# Patient Record
Sex: MALE | Race: BLACK OR AFRICAN AMERICAN | Employment: OTHER | URBAN - METROPOLITAN AREA
[De-identification: names, ages, dates, MRNs, and addresses within clinical notes are randomized per-mention and may not be internally consistent; named-entity substitution may affect disease eponyms.]

---

## 2017-01-17 ENCOUNTER — TELEPHONE (OUTPATIENT)
Dept: CARDIOLOGY CLINIC | Age: 62
End: 2017-01-17

## 2017-01-17 DIAGNOSIS — I48.91 ATRIAL FIBRILLATION, UNSPECIFIED TYPE (HCC): Primary | ICD-10-CM

## 2017-01-17 NOTE — TELEPHONE ENCOUNTER
Dr. Wayne Ag' office called regarding pts POA and would like for Dr. Elyse Rousseau to call at his convinience.  The office number is [phone]

## 2017-01-18 RX ORDER — ASPIRIN 81 MG/1
81 TABLET ORAL DAILY
Qty: 30 TAB | Refills: 6
Start: 2017-01-18 | End: 2022-05-18

## 2017-01-18 NOTE — TELEPHONE ENCOUNTER
Spoke with Dr. Julisa Winn. He saw Mr. Wilson yesterday for follow up evaluation of fatigue and dyspnea. New dx of AF 12/2016. EKG from visit yesterday. AF, rate 51 (Metoprolol reduced further to 25 mg daily)    Previous EKG 12/7/16 - AF, rate 43 (beta blocker dose decreased at that time also)    Mr. Rosa Blake has been tentatively diagnosed with RANDY per home study ( he reports >50 documented episodes of apnea). He is awaiting repeat sleep study and Rx of CPAP. Reviewed lab work received from [facility] office - drawn 12/12/16:  H/H 12.6/40.9  Creat 1.0   BUN 14  K 4.0    Discussed moving forward with NOAC, as discussed with Dr. Darci Moura during office visit 12/7/16. Will begin Eliquis 5 mg BID. Reviewed MOA and the importance of close monitoring for abnormal bleeding. Discussed when and how to take medication. He voices understanding of medication and instructions and is agreeable to proceed with Rx. Advised that he contact us with any signs of abnormal bleeding. Plan to reduce ASA dose from 325 mg daily down to 81 mg daily.

## 2017-01-18 NOTE — TELEPHONE ENCOUNTER
Unable to speak to anyone in Dr. Shannon Hammond office. Sent to [place]. Contacted patient to follow-up on his symptoms. He states he saw his PCP the other day. KIM and fatigue is about the same as when he was last here on 12/7/16. He states he was positive for sleep apnea. He has additional testing scheduled for the end of this month. He has not received a CPAP yet. Patient notified that lab work has been received from his PCP. Will have NP review.

## 2017-01-19 ENCOUNTER — TELEPHONE (OUTPATIENT)
Dept: CARDIOLOGY CLINIC | Age: 62
End: 2017-01-19

## 2017-01-19 DIAGNOSIS — I48.91 ATRIAL FIBRILLATION, UNSPECIFIED TYPE (HCC): ICD-10-CM

## 2017-01-19 NOTE — TELEPHONE ENCOUNTER
Patient called regarding samples of Eliquis. He is waiting for his mail order rx to arrive and needs samples until then. Please give him a call at [phone].  Thank you

## 2017-08-28 ENCOUNTER — HOSPITAL ENCOUNTER (OUTPATIENT)
Dept: ULTRASOUND IMAGING | Age: 62
Discharge: HOME | End: 2017-08-28
Attending: FAMILY MEDICINE
Payer: COMMERCIAL

## 2017-08-28 DIAGNOSIS — R10.9 ABDOMINAL PAIN, UNSPECIFIED SITE: ICD-10-CM

## 2017-08-28 PROCEDURE — 76700 US EXAM ABDOM COMPLETE: CPT

## 2020-02-20 ENCOUNTER — OFFICE VISIT (OUTPATIENT)
Dept: CARDIOLOGY CLINIC | Age: 65
End: 2020-02-20

## 2020-02-20 VITALS
OXYGEN SATURATION: 97 % | HEIGHT: 70 IN | HEART RATE: 69 BPM | DIASTOLIC BLOOD PRESSURE: 80 MMHG | WEIGHT: 256 LBS | BODY MASS INDEX: 36.65 KG/M2 | SYSTOLIC BLOOD PRESSURE: 128 MMHG

## 2020-02-20 DIAGNOSIS — E66.01 SEVERE OBESITY (HCC): ICD-10-CM

## 2020-02-20 DIAGNOSIS — Z95.1 S/P CABG X 3: ICD-10-CM

## 2020-02-20 DIAGNOSIS — I25.10 ATHEROSCLEROSIS OF NATIVE CORONARY ARTERY OF NATIVE HEART WITHOUT ANGINA PECTORIS: ICD-10-CM

## 2020-02-20 DIAGNOSIS — I11.9 BENIGN HYPERTENSIVE HEART DISEASE WITHOUT HEART FAILURE: ICD-10-CM

## 2020-02-20 DIAGNOSIS — I48.91 ATRIAL FIBRILLATION WITH SLOW VENTRICULAR RESPONSE (HCC): Primary | ICD-10-CM

## 2020-02-20 DIAGNOSIS — R06.09 DOE (DYSPNEA ON EXERTION): ICD-10-CM

## 2020-02-20 NOTE — PROGRESS NOTES
Radha Gillette is a 75 y.o. male    Chief Complaint   Patient presents with    Coronary Artery Disease       Chest pain no  SOB no  Dizziness no  Swelling no  Recent hospital visit no  Refills no  Visit Vitals  /80 (BP 1 Location: Left arm, BP Patient Position: At rest)   Pulse 69   Ht 5' 10\" (1.778 m)   Wt 256 lb (116.1 kg)   SpO2 97%   BMI 36.73 kg/m²

## 2020-02-20 NOTE — LETTER
2/23/20 Patient: Margarito Alan YOB: 1955 Date of Visit: 2/20/2020 Magalis Quintana MD 
[address] [address] VIA Facsimile: [phone] Dear Magalis Quintana MD, Thank you for referring Mr. Van Grewal to [facility] for evaluation. My notes for this consultation are attached. If you have questions, please do not hesitate to call me. I look forward to following your patient along with you. Sincerely, Dontae Eaton MD

## 2020-02-20 NOTE — PROGRESS NOTES
Adebayo Jacobson MD [facility]  [address]  [place], [address] [facility]    Office [phone]  Fax [phone]  Cell [phone]         Radha Gillette is a 75 y.o. male. Last seen by me 12/7/2016. Here at the request of Dr Larisa Bernabe for revaluation of CAD/AF    Assessment  Encounter Diagnoses   Name Primary?  Atherosclerosis of native coronary artery of native heart without angina pectoris     Benign hypertensive heart disease without heart failure     Atrial fibrillation with slow ventricular response (HCC) Yes    S/P CABG x 3     KIM (dyspnea on exertion)     Severe obesity (HCC)        Recommendations:    CAD s/p CABG 2006. Lexiscan cardiolite 2015 was normal. He has chronic KIM and fatigue but leads fairly sedentary lifestyle. Drivers of risk include HTN, and dyslipidemia. Favor re-evaluation with stress testing in near future. - Exercise Cardiolite in 3-4 weeks, after washing out Metoprolol  - Continue ASA 81mg/d    Chronic AF with slow VR on low dose metoprolol. Wonder if fatigue may be d/t bradycardia. Substrate = HTN, CAD, and RANDY. - Continue rate control but stop Metoprolol completely. - Evaluate HR response w/ exercise w/ upcoming stress test  - Stroke prevention with Eliquis 5 BID    Chronic HTN, controlled on combination therapy. - Continue home BP monitoring    Phone follow up after reviewing tests      Subjective:    Radha Gillette reports he is feeling okay. He reports he walks around his neighborhood for activity, with exertonal fatigue. Otherwise a fairly sedentary lifestyle with stable pattern of KIM. Patient denies any exertional chest pain, palpitations, syncope, orthopnea, edema or paroxysmal nocturnal dyspnea. He denies dizziness or lightheadedness. BP at home 130-140. He is adherent with CPAP for RANDY. No bleeding problems on Eliquis. He has run out as of a week ago. Of note, he visits [facility] at [facility].     Cardiac risk factors   HTN no  DM  yes    Cardiac testing  Stress echo 5/2011 - 8 minutes, normal, resting EF 60%  CT 8/29/13 - 138   Echo 12/14/16 - EF 55-60%. No WMA. Wall thickness is mild to moderately increased. Mild LVH. LA mildly dilated. AoV sclerosis without stenosis. Past Medical History:   Diagnosis Date    Atrial fibrillation with slow ventricular response ([address] 12/10/2016    Benign hypertensive heart disease without heart failure     Coronary atherosclerosis of native coronary artery     presented with NSTEMI, s/p CABG 7419    Metabolic disorder mixed hyperlipidemia     S/P CABG x 3 2006    LIMA-LAD, VG-OM, VG-PDA    Type II or unspecified type diabetes mellitus without mention of complication, not stated as uncontrolled         Current Outpatient Medications   Medication Sig Dispense Refill    apixaban (ELIQUIS) 5 mg tablet Take 1 Tab by mouth two (2) times a day. Indications: PREVENT THROMBOEMBOLISM IN CHRONIC ATRIAL FIBRILLATION 180 Tab 3    aspirin delayed-release 81 mg tablet Take 1 Tab by mouth daily. Indications: myocardial infarction prevention 30 Tab 6    testosterone (ANDROGEL) 1.25 gram/ actuation (1 %) glpm 20.25 mg by TransDERmal route See Admin Instructions. 3 pumps daily      atorvastatin (LIPITOR) 40 mg tablet Take  by mouth daily.  cyclobenzaprine (FLEXERIL) 10 mg tablet Take  by mouth three (3) times daily as needed for Muscle Spasm(s).  tamsulosin (FLOMAX) 0.4 mg capsule Take 0.4 mg by mouth daily.  HYDROcodone-acetaminophen (NORCO) 5-325 mg per tablet Take  by mouth every eight (8) hours as needed for Pain.  metFORMIN (GLUCOPHAGE) 500 mg tablet Take  by mouth two (2) times daily (with meals).  fenofibric acid (TRILIPIX) 135 mg capsule Take  by mouth daily.  sildenafil citrate (VIAGRA) 100 mg tablet Take 100 mg by mouth as needed.  ergocalciferol (VITAMIN D2) 50,000 unit capsule Take 50,000 Units by mouth.       amlodipine (NORVASC) 10 mg tablet Take  by mouth daily.  clonidine (CATAPRESS) 0.2 mg tablet Take  by mouth two (2) times a day.  rosuvastatin (CRESTOR) 20 mg tablet Take 20 mg by mouth nightly.  DOCOSAHEXANOIC ACID/EPA (FISH OIL PO) Take  by mouth.  FERROUS FUMARATE (IRON PO) Take 1 Tab by mouth daily.  apixaban (ELIQUIS) 5 mg tablet Take 1 Tab by mouth two (2) times a day. 56 Tab 0    magnesium oxide (MAG-OX) 400 mg tablet Take 400 mg by mouth daily. Allergies   Allergen Reactions    Vitamin D [Cholecalciferol (Vitamin D3)] Hives     Large doses      Retired .       Review of Symptoms:  Constitutional: negative for fevers, chills and sweats. +fatigue. Respiratory: negative for cough, sputum or hemoptysis. +KIM   Gastrointestinal: negative for dysphagia, odynophagia and abdominal pain  Musculoskeletal:negative for myalgias and arthralgias  Neurological: negative for headaches, dizziness and memory problems      Physical Exam    Visit Vitals  /80 (BP 1 Location: Left arm, BP Patient Position: At rest)   Pulse 69   Ht 5' 10\" (1.778 m)   Wt 256 lb (116.1 kg)   SpO2 97%   BMI 36.73 kg/m²     Wt Readings from Last 3 Encounters:   02/20/20 256 lb (116.1 kg)   12/07/16 265 lb 12.8 oz (120.6 kg)   03/02/15 267 lb 3.2 oz (121.2 kg)      Neck - JVP normal, thyroid nl  Cardiac - Irregularly irregular rhythm   Vascular - carotids without bruits, radials, femorals and pedal pulses equal bilateral  Lungs - clear to auscultation bilaterals, no rales ,wheezing or rhonchi  Abd - soft nontender, no HSM, no abd bruits  Extremities - no edema    Cardiographics  Labs May 2014 - , , HDL 74, TG 85, HB Ac 8.2,   EKG 3/2/15 - SR, Nonspecific T wave inversions V2 and V3, NSSTT abnormalities   Lexican cardiolite 03/17/15 - normal perfusion LVEF 56%. EKG 12/07/16 - AF 40s, compared with tracing 01/20/16 AF newly noted.      Written by Tamanna Crook, as dictated by Brit Johnston MD. Sasha Ellison MD

## 2020-02-23 PROBLEM — E66.01 SEVERE OBESITY (HCC): Status: ACTIVE | Noted: 2020-02-23

## 2020-03-20 ENCOUNTER — DOCUMENTATION ONLY (OUTPATIENT)
Dept: CARDIOLOGY CLINIC | Age: 65
End: 2020-03-20

## 2020-03-20 NOTE — PROGRESS NOTES
Please reschedule Mr. Wilson 2 day nuclear test per Dr. Oswaldo Villagran ([address]). Patient told office is still open if he needs to reach out with any concerns or questions. Louis Vargas

## 2020-06-08 ENCOUNTER — TELEPHONE (OUTPATIENT)
Dept: CARDIOLOGY CLINIC | Age: 65
End: 2020-06-08

## 2020-06-08 ENCOUNTER — DOCUMENTATION ONLY (OUTPATIENT)
Dept: CARDIOLOGY CLINIC | Age: 65
End: 2020-06-08

## 2020-06-08 NOTE — PROGRESS NOTES
L/M for patient to call back. He has a exercise Nuclear stress test coming up June 16/17 2020. He will need a COVID test by 6/10/2020. It is at no charge as long as it is done at one of our 4 testing facilities.

## 2020-06-08 NOTE — PROGRESS NOTES
Gave patient COVID19 testing instructions for upcoming nuclear testing. Patient will be going to [facility]. Patient verbalized understanding that this needs to be done by 6/10/2020 and to avoid crowds prior to his appointment. Paper faxed to [facility].

## 2020-06-12 ENCOUNTER — OFFICE VISIT (OUTPATIENT)
Dept: PRIMARY CARE CLINIC | Age: 65
End: 2020-06-12

## 2020-06-12 DIAGNOSIS — Z01.818 PRE-OP EVALUATION: Primary | ICD-10-CM

## 2020-06-14 LAB — SARS-COV-2, NAA: NOT DETECTED

## 2020-06-16 NOTE — TELEPHONE ENCOUNTER
Patient wanting to know COVID results from test he had to take prior to nuclear study. Please advise.      Phone: [phone]

## 2020-06-19 ENCOUNTER — TELEPHONE (OUTPATIENT)
Dept: CARDIOLOGY CLINIC | Age: 65
End: 2020-06-19

## 2020-06-19 NOTE — TELEPHONE ENCOUNTER
Exercise cardiolite 6/19/20 - 3:46 min, good HR response, normal perfusion/EF    No myocardial ischemia  HR better off metoprolol  Energy a bit better    Findings reviewed with Mr Yaniv Alejandra    RTC 4 months

## 2020-11-24 ENCOUNTER — OFFICE VISIT (OUTPATIENT)
Dept: CARDIOLOGY CLINIC | Age: 65
End: 2020-11-24
Payer: MEDICARE

## 2020-11-24 VITALS
OXYGEN SATURATION: 98 % | SYSTOLIC BLOOD PRESSURE: 132 MMHG | HEIGHT: 70 IN | HEART RATE: 84 BPM | BODY MASS INDEX: 34.93 KG/M2 | DIASTOLIC BLOOD PRESSURE: 86 MMHG | WEIGHT: 244 LBS

## 2020-11-24 DIAGNOSIS — E11.9 TYPE 2 DIABETES MELLITUS WITHOUT COMPLICATION, WITHOUT LONG-TERM CURRENT USE OF INSULIN (HCC): ICD-10-CM

## 2020-11-24 DIAGNOSIS — I25.10 ATHEROSCLEROSIS OF NATIVE CORONARY ARTERY OF NATIVE HEART WITHOUT ANGINA PECTORIS: Primary | ICD-10-CM

## 2020-11-24 DIAGNOSIS — Z95.1 S/P CABG X 3: ICD-10-CM

## 2020-11-24 DIAGNOSIS — I48.20 CHRONIC ATRIAL FIBRILLATION (HCC): ICD-10-CM

## 2020-11-24 DIAGNOSIS — I11.9 BENIGN HYPERTENSIVE HEART DISEASE WITHOUT HEART FAILURE: ICD-10-CM

## 2020-11-24 DIAGNOSIS — E66.01 SEVERE OBESITY (HCC): ICD-10-CM

## 2020-11-24 DIAGNOSIS — E78.2 MIXED HYPERLIPIDEMIA: ICD-10-CM

## 2020-11-24 PROCEDURE — G0463 HOSPITAL OUTPT CLINIC VISIT: HCPCS | Performed by: SPECIALIST

## 2020-11-24 PROCEDURE — 99214 OFFICE O/P EST MOD 30 MIN: CPT | Performed by: SPECIALIST

## 2020-11-24 NOTE — LETTER
11/25/20 Patient: Román William YOB: 1955 Date of Visit: 11/24/2020 Lizbeth Monaco MD 
[address] [address] VIA Facsimile: [phone] Dear Lizbeth Monaco MD, Thank you for referring Mr. Jordy Jimeenz to [facility] for evaluation. My notes for this consultation are attached. If you have questions, please do not hesitate to call me. I look forward to following your patient along with you. Sincerely, Keara Velasco MD

## 2020-11-24 NOTE — PROGRESS NOTES
Adebayo Zimmemran MD [facility]  [address] [facility], [address] [facility]    Office [phone]  Fax [phone]  Cell [phone]         Ami Ma is a 77 y.o. male. Last seen by me 9 months ago. Assessment  Encounter Diagnoses   Name Primary?  Chronic atrial fibrillation (HCC)     Benign hypertensive heart disease without heart failure     Atherosclerosis of native coronary artery of native heart without angina pectoris Yes    Metabolic disorder mixed hyperlipidemia     S/P CABG x 3     Severe obesity (HCC)     Type 2 diabetes mellitus without complication, without long-term current use of insulin (HCC)        Recommendations:    CAD s/p CABG 2006. Exercise Cardiolite June 2020 normal. He has no exertional sxs but is fairly sedentary. Drivers of risk include HTN, prediabetes, and dyslipidemia.  - Continue ASA 81mg/d    Chronic AF w/ controlled VR on no AV edgar medications. Metoprolol discontinued Feb 2020. with slow VR on low dose metoprolol. Substrate = HTN, CAD, and RANDY. Reviewed sxs of bradycardia that would raise concern. HR response during recent stress test was nl.   - Continue Eliquis 5 mg BID for stroke prevention     Chronic HTN, controlled on combination therapy. - Continue home BP monitoring    Dyslipidemia/prediabetes managed by Hugh Feng MD.     6 month f/u       Subjective:    He reports no interval cardiac sxs and feels well. Patient denies any exertional chest pain, dyspnea, palpitations, syncope, orthopnea, edema or paroxysmal nocturnal dyspnea. He reports staying active by walking and using the elliptical machine w/o exertional sxs. BP at home 459 systolic. Lipids/blood work through Hugh Feng MD.     Adherent with CPAP for RANDY. No bleeding problems on Eliquis. Of note, he visits [facility].  He lives in [place], [place]    Cardiac risk factors   HTN no  DM  yes    Cardiac testing  Stress echo 5/2011 - 8 minutes, normal, resting EF 60%  CT 8/29/13 - 138   Echo 12/14/16 - EF 55-60%. No WMA. Wall thickness is mild to moderately increased. Mild LVH. LA mildly dilated. AoV sclerosis without stenosis. Exercise Cardiolite 6/9/20- 6:36 min, normal perfusion, EF 55%       Past Medical History:   Diagnosis Date    Atrial fibrillation with slow ventricular response ([address] 12/10/2016    Benign hypertensive heart disease without heart failure     Coronary atherosclerosis of native coronary artery     presented with NSTEMI, s/p CABG 4314    Metabolic disorder mixed hyperlipidemia     S/P CABG x 3 2006    LIMA-LAD, VG-OM, VG-PDA    Type II or unspecified type diabetes mellitus without mention of complication, not stated as uncontrolled         Current Outpatient Medications   Medication Sig Dispense Refill    apixaban (ELIQUIS) 5 mg tablet Take 1 Tab by mouth two (2) times a day. Indications: PREVENT THROMBOEMBOLISM IN CHRONIC ATRIAL FIBRILLATION 180 Tab 3    apixaban (ELIQUIS) 5 mg tablet Take 1 Tab by mouth two (2) times a day. 56 Tab 0    aspirin delayed-release 81 mg tablet Take 1 Tab by mouth daily. Indications: myocardial infarction prevention 30 Tab 6    testosterone (ANDROGEL) 1.25 gram/ actuation (1 %) glpm 20.25 mg by TransDERmal route See Admin Instructions. 3 pumps daily      cyclobenzaprine (FLEXERIL) 10 mg tablet Take  by mouth three (3) times daily as needed for Muscle Spasm(s).  tamsulosin (FLOMAX) 0.4 mg capsule Take 0.4 mg by mouth two (2) times a day.  HYDROcodone-acetaminophen (NORCO) 5-325 mg per tablet Take  by mouth every eight (8) hours as needed for Pain.  metFORMIN (GLUCOPHAGE) 500 mg tablet Take  by mouth two (2) times daily (with meals).  fenofibric acid (TRILIPIX) 135 mg capsule Take  by mouth daily.  sildenafil citrate (VIAGRA) 100 mg tablet Take 100 mg by mouth as needed.       ergocalciferol (VITAMIN D2) 50,000 unit capsule Take 50,000 Units by mouth every seven (7) days.  amlodipine (NORVASC) 10 mg tablet Take  by mouth daily.  clonidine (CATAPRESS) 0.2 mg tablet Take  by mouth two (2) times a day.  rosuvastatin (CRESTOR) 20 mg tablet Take 20 mg by mouth nightly.  DOCOSAHEXANOIC ACID/EPA (FISH OIL PO) Take  by mouth two (2) times a day.  FERROUS FUMARATE (IRON PO) Take 1 Tab by mouth as needed.  atorvastatin (LIPITOR) 40 mg tablet Take  by mouth daily.  magnesium oxide (MAG-OX) 400 mg tablet Take 400 mg by mouth daily. Allergies   Allergen Reactions    Vitamin D [Cholecalciferol (Vitamin D3)] Hives     Large doses      Retired .       Review of Symptoms:  Constitutional: negative for fevers, chills and sweats. +fatigue. Respiratory: negative for cough, sputum or hemoptysis. +KIM   Gastrointestinal: negative for dysphagia, odynophagia and abdominal pain  Musculoskeletal:negative for myalgias and arthralgias  Neurological: negative for headaches, dizziness and memory problems      Physical Exam    Visit Vitals  /86 (BP 1 Location: Left arm, BP Patient Position: Sitting)   Pulse 84   Ht 5' 10\" (1.778 m)   Wt 244 lb (110.7 kg)   SpO2 98%   BMI 35.01 kg/m²     Wt Readings from Last 3 Encounters:   11/24/20 244 lb (110.7 kg)   02/20/20 256 lb (116.1 kg)   12/07/16 265 lb 12.8 oz (120.6 kg)      Neck - JVP normal, thyroid nl  Cardiac - Irregularly irregular rhythm   Vascular - carotids without bruits, radials, femorals and pedal pulses equal bilateral  Lungs - clear to auscultation bilaterals, no rales ,wheezing or rhonchi  Abd - soft nontender, no HSM, no abd bruits  Extremities - no edema    Cardiographics  Labs May 2014 - , , HDL 74, TG 85, HB Ac 8.2,   EKG 3/2/15 - SR, Nonspecific T wave inversions V2 and V3, NSSTT abnormalities   Lexican cardiolite 03/17/15 - normal perfusion LVEF 56%. EKG 12/07/16 - AF 40s, compared with tracing 01/20/16 AF newly noted.    Exercise Cardiolite 6/9/20- 6:36 min, normal perfusion, EF 55%    Written by Elvi Joya, as dictated by Tiana Dumont MD.     Tiana Dumont MD

## 2021-02-04 LAB — SARS-COV-2 AB, IGG, CORG1M: REACTIVE

## 2021-05-05 ENCOUNTER — OFFICE VISIT (OUTPATIENT)
Dept: CARDIOLOGY CLINIC | Age: 66
End: 2021-05-05
Payer: MEDICARE

## 2021-05-05 VITALS
SYSTOLIC BLOOD PRESSURE: 135 MMHG | DIASTOLIC BLOOD PRESSURE: 85 MMHG | OXYGEN SATURATION: 98 % | WEIGHT: 240 LBS | HEART RATE: 80 BPM | HEIGHT: 70 IN | BODY MASS INDEX: 34.36 KG/M2

## 2021-05-05 DIAGNOSIS — I48.20 CHRONIC ATRIAL FIBRILLATION (HCC): Primary | ICD-10-CM

## 2021-05-05 DIAGNOSIS — I11.9 BENIGN HYPERTENSIVE HEART DISEASE WITHOUT HEART FAILURE: ICD-10-CM

## 2021-05-05 DIAGNOSIS — Z95.1 S/P CABG X 3: ICD-10-CM

## 2021-05-05 PROCEDURE — G8417 CALC BMI ABV UP PARAM F/U: HCPCS | Performed by: SPECIALIST

## 2021-05-05 PROCEDURE — 93005 ELECTROCARDIOGRAM TRACING: CPT | Performed by: SPECIALIST

## 2021-05-05 PROCEDURE — 3017F COLORECTAL CA SCREEN DOC REV: CPT | Performed by: SPECIALIST

## 2021-05-05 PROCEDURE — 1101F PT FALLS ASSESS-DOCD LE1/YR: CPT | Performed by: SPECIALIST

## 2021-05-05 PROCEDURE — 99214 OFFICE O/P EST MOD 30 MIN: CPT | Performed by: SPECIALIST

## 2021-05-05 PROCEDURE — G8432 DEP SCR NOT DOC, RNG: HCPCS | Performed by: SPECIALIST

## 2021-05-05 PROCEDURE — G0463 HOSPITAL OUTPT CLINIC VISIT: HCPCS | Performed by: SPECIALIST

## 2021-05-05 PROCEDURE — 93010 ELECTROCARDIOGRAM REPORT: CPT | Performed by: SPECIALIST

## 2021-05-05 PROCEDURE — G8427 DOCREV CUR MEDS BY ELIG CLIN: HCPCS | Performed by: SPECIALIST

## 2021-05-05 PROCEDURE — G8536 NO DOC ELDER MAL SCRN: HCPCS | Performed by: SPECIALIST

## 2021-05-05 RX ORDER — CARVEDILOL 3.12 MG/1
3.12 TABLET ORAL 2 TIMES DAILY WITH MEALS
Qty: 40 TAB | Refills: 0
Start: 2021-05-05

## 2021-05-05 NOTE — PROGRESS NOTES
Bruce Atwood MD. [facility]              Patient: Susanna Presley  : 1955      Today's Date: 2021          HISTORY OF PRESENT ILLNESS:     History of Present Illness:  Here for routine FU. No CP or SOB. No dizziness. He feels well. No bleeding. He has been skipping Eliquis past month, waiting for deductible to come down. PAST MEDICAL HISTORY:     Past Medical History:   Diagnosis Date    Atrial fibrillation with slow ventricular response ([address] 12/10/2016    Benign hypertensive heart disease without heart failure     Coronary atherosclerosis of native coronary artery     presented with NSTEMI, s/p CABG 8632    Metabolic disorder mixed hyperlipidemia     S/P CABG x 3     LIMA-LAD, VG-OM, VG-PDA    Type II or unspecified type diabetes mellitus without mention of complication, not stated as uncontrolled              MEDICATIONS:     Current Outpatient Medications   Medication Sig Dispense Refill    carvediloL (COREG) 3.125 mg tablet Take 1 Tab by mouth two (2) times daily (with meals). 40 Tab 0    aspirin delayed-release 81 mg tablet Take 1 Tab by mouth daily. Indications: myocardial infarction prevention 30 Tab 6    testosterone (ANDROGEL) 1.25 gram/ actuation (1 %) glpm 20.25 mg by TransDERmal route See Admin Instructions. 3 pumps daily      tamsulosin (FLOMAX) 0.4 mg capsule Take 0.4 mg by mouth two (2) times a day.  metFORMIN (GLUCOPHAGE) 500 mg tablet Take  by mouth two (2) times daily (with meals).  fenofibric acid (TRILIPIX) 135 mg capsule Take  by mouth daily.  sildenafil citrate (VIAGRA) 100 mg tablet Take 100 mg by mouth as needed.  ergocalciferol (VITAMIN D2) 50,000 unit capsule Take 50,000 Units by mouth every seven (7) days.  amlodipine (NORVASC) 10 mg tablet Take  by mouth daily.  clonidine (CATAPRESS) 0.2 mg tablet Take  by mouth two (2) times a day.  rosuvastatin (CRESTOR) 20 mg tablet Take 20 mg by mouth nightly.       DOCOSAHEXANOIC ACID/EPA (FISH OIL PO) Take  by mouth two (2) times a day.  FERROUS FUMARATE (IRON PO) Take 1 Tab by mouth as needed.  apixaban (ELIQUIS) 5 mg tablet Take 1 Tab by mouth two (2) times a day. Indications: PREVENT THROMBOEMBOLISM IN CHRONIC ATRIAL FIBRILLATION 180 Tab 3    apixaban (ELIQUIS) 5 mg tablet Take 1 Tab by mouth two (2) times a day. 56 Tab 0    cyclobenzaprine (FLEXERIL) 10 mg tablet Take  by mouth three (3) times daily as needed for Muscle Spasm(s).  HYDROcodone-acetaminophen (NORCO) 5-325 mg per tablet Take  by mouth every eight (8) hours as needed for Pain.  magnesium oxide (MAG-OX) 400 mg tablet Take 400 mg by mouth daily. Allergies   Allergen Reactions    Vitamin D [Cholecalciferol (Vitamin D3)] Hives     Large doses           SOCIAL HISTORY:     Social History     Tobacco Use    Smoking status: Never Smoker    Smokeless tobacco: Never Used   Substance Use Topics    Alcohol use: Yes     Comment: twice a mo    Drug use: Not Currently         FAMILY HISTORY:     Family History   Problem Relation Age of Onset    Heart Attack Father                REVIEW OF SYMPTOMS:     Review of Symptoms:  Constitutional: Negative for fever, chills  HEENT: Negative for nosebleeds, tinnitus, and vision changes. Respiratory: Negative for cough, wheezing  Cardiovascular: Negative for orthopnea, claudication, syncope, and PND. Gastrointestinal: Negative for abdominal pain, diarrhea, melena. Genitourinary: Negative for dysuria  Musculoskeletal: Negative for myalgias. Skin: Negative for rash  Heme: No problems bleeding. Neurological: Negative for speech change and focal weakness.            PHYSICAL EXAM:     Physical Exam:  Visit Vitals  /85 (BP 1 Location: Left upper arm, BP Patient Position: Sitting, BP Cuff Size: Adult)   Pulse 80   Ht 5' 10\" (1.778 m)   Wt 240 lb (108.9 kg)   SpO2 98%   BMI 34.44 kg/m²     Patient appears generally well, mood and affect are appropriate and pleasant. HEENT:  Hearing intact, non-icteric, normocephalic, atraumatic. Neck Exam: Supple, No JVD or carotid bruits. Lung Exam: Clear to auscultation, even breath sounds. Cardiac Exam: Irregularly irregular no murmur or rub  Abdomen: Soft, non-tender, normal bowel sounds. No bruits or masses. Extremities: Moves all ext well. No lower extremity edema. MSKTL: Overall good ROM ext  Skin: No significant rashes  Psych: Appropriate affect  Neuro - Grossly intact      LABS / OTHER STUDIES reviewed:       Lab Results   Component Value Date/Time    Bilirubin, total 0.6 07/28/2009 10:21 AM    Alk. phosphatase 68 07/28/2009 10:21 AM    Protein, total 8.3 (H) 07/28/2009 10:21 AM    Albumin 4.6 07/28/2009 10:21 AM    Globulin 3.7 07/28/2009 10:21 AM    A-G Ratio 1.2 07/28/2009 10:21 AM    ALT (SGPT) 39 07/28/2009 10:21 AM    AST (SGOT) 19 07/28/2009 10:21 AM     No results found for: WBC, WBCLT, HGBPOC, HGB, HGBP, HCTPOC, HCT, PHCT, RBCH, PLT, MCV, HGBEXT, HCTEXT, PLTEXT, HGBEXT, HCTEXT, PLTEXT    Lab Results   Component Value Date/Time    Cholesterol, total 193 07/28/2009 10:21 AM    HDL Cholesterol 47 07/28/2009 10:21 AM    LDL, calculated 131.4 (H) 07/28/2009 10:21 AM    VLDL, calculated 14.6 07/28/2009 10:21 AM    Triglyceride 73 07/28/2009 10:21 AM    CHOL/HDL Ratio 4.1 07/28/2009 10:21 AM       Lasb 2/21 - Ap[address] 118, proBNP 287, CMP OK,         CARDIAC DIAGNOSTICS:     Cardiac Evaluation Includes:  I reviewed the results below. Stress echo 5/2011 - 8 minutes, normal, resting EF 60%  CT 8/29/13 - 138   Echo 12/14/16 - EF 55-60%. No WMA. Wall thickness is mild to moderately increased. Mild LVH. LA mildly dilated. AoV sclerosis without stenosis.     Exercise Cardiolite 6/9/20- 6:36 min, normal perfusion, EF 55%     EKG 2/3/21 - Afib, HR 44 bpm   EKG 5/5/21 - Afib,       ASSESSMENT AND PLAN:     Assessment and Plan:    1) CAD s/p CABG 2006.    - Exercise Cardiolite June 2020 normal.   - He has no exertional sxs but is fairly sedentary   - Continue ASA 81mg/d, BB, statin      2) Chronic AF w/ controlled VR on no AV edgar medications. - Had metoprolol stopped in 2/20 due to bradycardia but he is on low dose Coreg when I see him 5/5/21   - Continue Eliquis 5 mg BID for stroke prevention (he has been skipping [address] when he is in the \"donut hole\" -- I reviewed importance of compliance and asked him to reach out to us when he needs help)      3) Chronic HTN,  - BP OK at home  - Continue meds and home BP monitoring     4) Dyslipidemia/prediabetes managed by Belia Wood MD.  - on a statin and fibrate     5) See me in 6 months.         Lives with wife. Retired from [employer]. Bao Espinoza MD, [place] [address]. [address], [address] [place] [address]  Ph: [phone]   Ph [phone]      ADDENDUM   7/28/2021  Brent Richmond 7/27/21 - ventricular perfusion is probably normal. Myocardial perfusion imaging supports a low risk stress test.  LVEF 54%    Will have nurse call with normal findings.

## 2021-05-05 NOTE — PROGRESS NOTES
Julien Strickland is a 77 y.o. male    Visit Vitals  BP (!) 150/80 (BP 1 Location: Left upper arm, BP Patient Position: Sitting, BP Cuff Size: Adult)   Pulse 80   Ht 5' 10\" (1.778 m)   Wt 240 lb (108.9 kg)   SpO2 98%   BMI 34.44 kg/m²       Chief Complaint   Patient presents with    Follow-up     6 mo; former [place] pt    Irregular Heart Beat     AF    Other     s/p CABG x3; athero       Chest pain NO  SOB NO  Dizziness NO  Swelling LEGS  Recent hospital visit NO  Refills NO

## 2021-06-21 LAB — HBA1C MFR BLD HPLC: 5 %

## 2021-07-01 ENCOUNTER — TELEPHONE (OUTPATIENT)
Dept: CARDIOLOGY CLINIC | Age: 66
End: 2021-07-01

## 2021-07-01 NOTE — TELEPHONE ENCOUNTER
Patient requesting to speak to nurse. States he was prescribed a new medication by his primary care and was told to see Dr. Shannon Brizuela as soon as possible. Offered first available at [address] [place] [address] 07/21; patient stated he needed an earlier date. Patient did not have name of medication at time of call.      Phone: [phone]
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
## 2021-07-02 ENCOUNTER — OFFICE VISIT (OUTPATIENT)
Dept: CARDIOLOGY CLINIC | Age: 66
End: 2021-07-02
Payer: MEDICARE

## 2021-07-02 VITALS
SYSTOLIC BLOOD PRESSURE: 118 MMHG | HEART RATE: 60 BPM | OXYGEN SATURATION: 97 % | BODY MASS INDEX: 35.07 KG/M2 | DIASTOLIC BLOOD PRESSURE: 62 MMHG | HEIGHT: 70 IN | WEIGHT: 245 LBS

## 2021-07-02 DIAGNOSIS — Z95.1 S/P CABG X 3: ICD-10-CM

## 2021-07-02 DIAGNOSIS — R94.31 ABNORMAL EKG: Primary | ICD-10-CM

## 2021-07-02 DIAGNOSIS — I11.9 BENIGN HYPERTENSIVE HEART DISEASE WITHOUT HEART FAILURE: ICD-10-CM

## 2021-07-02 DIAGNOSIS — I48.91 ATRIAL FIBRILLATION WITH SLOW VENTRICULAR RESPONSE (HCC): ICD-10-CM

## 2021-07-02 DIAGNOSIS — E11.9 TYPE 2 DIABETES MELLITUS WITHOUT COMPLICATION, WITHOUT LONG-TERM CURRENT USE OF INSULIN (HCC): ICD-10-CM

## 2021-07-02 DIAGNOSIS — E78.2 MIXED HYPERLIPIDEMIA: ICD-10-CM

## 2021-07-02 DIAGNOSIS — I25.119 ATHEROSCLEROSIS OF NATIVE CORONARY ARTERY OF NATIVE HEART WITH ANGINA PECTORIS (HCC): ICD-10-CM

## 2021-07-02 DIAGNOSIS — E66.01 SEVERE OBESITY (HCC): ICD-10-CM

## 2021-07-02 PROCEDURE — 3017F COLORECTAL CA SCREEN DOC REV: CPT | Performed by: NURSE PRACTITIONER

## 2021-07-02 PROCEDURE — G8536 NO DOC ELDER MAL SCRN: HCPCS | Performed by: NURSE PRACTITIONER

## 2021-07-02 PROCEDURE — G8432 DEP SCR NOT DOC, RNG: HCPCS | Performed by: NURSE PRACTITIONER

## 2021-07-02 PROCEDURE — 93010 ELECTROCARDIOGRAM REPORT: CPT | Performed by: NURSE PRACTITIONER

## 2021-07-02 PROCEDURE — 1101F PT FALLS ASSESS-DOCD LE1/YR: CPT | Performed by: NURSE PRACTITIONER

## 2021-07-02 PROCEDURE — 3046F HEMOGLOBIN A1C LEVEL >9.0%: CPT | Performed by: NURSE PRACTITIONER

## 2021-07-02 PROCEDURE — G0463 HOSPITAL OUTPT CLINIC VISIT: HCPCS | Performed by: NURSE PRACTITIONER

## 2021-07-02 PROCEDURE — 2022F DILAT RTA XM EVC RTNOPTHY: CPT | Performed by: NURSE PRACTITIONER

## 2021-07-02 PROCEDURE — 93005 ELECTROCARDIOGRAM TRACING: CPT | Performed by: NURSE PRACTITIONER

## 2021-07-02 PROCEDURE — G8417 CALC BMI ABV UP PARAM F/U: HCPCS | Performed by: NURSE PRACTITIONER

## 2021-07-02 PROCEDURE — G8427 DOCREV CUR MEDS BY ELIG CLIN: HCPCS | Performed by: NURSE PRACTITIONER

## 2021-07-02 PROCEDURE — 99214 OFFICE O/P EST MOD 30 MIN: CPT | Performed by: NURSE PRACTITIONER

## 2021-07-02 NOTE — PATIENT INSTRUCTIONS
I am going to recommend that we order another stress test to further evaluate your pain due to your EKG changes. If you have any increase in pain or frequency, please present to the ER or call 911. For your blood thinner: It is very important that you take this medication twice daily as prescribed to prevent stroke due to atrial fibrillation. I am going to provide you with Eliquis samples while you complete the patient assistance forms.     Also, call your insurance provider to see if they have a preferred anticoagulant for you to take (Xarelto, Coumadin, Savaysa, etc. )
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
## 2021-07-27 ENCOUNTER — TELEPHONE (OUTPATIENT)
Dept: CARDIOLOGY CLINIC | Age: 66
End: 2021-07-27

## 2021-07-27 DIAGNOSIS — I48.91 ATRIAL FIBRILLATION WITH SLOW VENTRICULAR RESPONSE (HCC): ICD-10-CM

## 2021-07-27 DIAGNOSIS — I11.9 BENIGN HYPERTENSIVE HEART DISEASE WITHOUT HEART FAILURE: ICD-10-CM

## 2021-07-27 DIAGNOSIS — I25.10 ATHEROSCLEROSIS OF NATIVE CORONARY ARTERY OF NATIVE HEART WITHOUT ANGINA PECTORIS: Primary | ICD-10-CM

## 2021-07-27 DIAGNOSIS — Z95.1 S/P CABG X 3: ICD-10-CM
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
## 2021-07-27 NOTE — TELEPHONE ENCOUNTER
Patient requesting to speak to nurse in regards to nuclear test tomorrow. States he would like to know if he should hold any medications.      Phone: [phone]

## 2021-07-28 ENCOUNTER — ANCILLARY PROCEDURE (OUTPATIENT)
Dept: CARDIOLOGY CLINIC | Age: 66
End: 2021-07-28
Payer: MEDICARE

## 2021-07-28 VITALS — WEIGHT: 245 LBS | HEIGHT: 70 IN | BODY MASS INDEX: 35.07 KG/M2

## 2021-07-28 DIAGNOSIS — I25.10 ATHEROSCLEROSIS OF NATIVE CORONARY ARTERY OF NATIVE HEART WITHOUT ANGINA PECTORIS: ICD-10-CM

## 2021-07-28 DIAGNOSIS — Z95.1 S/P CABG X 3: ICD-10-CM

## 2021-07-28 DIAGNOSIS — I11.9 BENIGN HYPERTENSIVE HEART DISEASE WITHOUT HEART FAILURE: ICD-10-CM

## 2021-07-28 DIAGNOSIS — I48.91 ATRIAL FIBRILLATION WITH SLOW VENTRICULAR RESPONSE (HCC): ICD-10-CM

## 2021-07-28 LAB
STRESS BASELINE DIAS BP: 92 MMHG
STRESS BASELINE HR: 54 BPM
STRESS BASELINE SYS BP: 162 MMHG
STRESS ESTIMATED WORKLOAD: 1 METS
STRESS EXERCISE DUR MIN: NORMAL
STRESS O2 SAT PEAK: 97 %
STRESS O2 SAT REST: 97 %
STRESS PEAK DIAS BP: 80 MMHG
STRESS PEAK SYS BP: 138 MMHG
STRESS PERCENT HR ACHIEVED: 70 %
STRESS POST PEAK HR: 107 BPM
STRESS RATE PRESSURE PRODUCT: NORMAL BPM*MMHG
STRESS ST DEPRESSION: 0 MM
STRESS ST ELEVATION: 0 MM
STRESS TARGET HR: 153 BPM

## 2021-07-28 PROCEDURE — 93018 CV STRESS TEST I&R ONLY: CPT | Performed by: SPECIALIST

## 2021-07-28 PROCEDURE — 93016 CV STRESS TEST SUPVJ ONLY: CPT | Performed by: SPECIALIST

## 2021-07-28 PROCEDURE — 93017 CV STRESS TEST TRACING ONLY: CPT | Performed by: SPECIALIST

## 2021-07-28 PROCEDURE — A9500 TC99M SESTAMIBI: HCPCS | Performed by: SPECIALIST

## 2021-07-28 PROCEDURE — 78452 HT MUSCLE IMAGE SPECT MULT: CPT | Performed by: SPECIALIST

## 2021-07-28 RX ORDER — TETRAKIS(2-METHOXYISOBUTYLISOCYANIDE)COPPER(I) TETRAFLUOROBORATE 1 MG/ML
8.3 INJECTION, POWDER, LYOPHILIZED, FOR SOLUTION INTRAVENOUS ONCE
Status: COMPLETED | OUTPATIENT
Start: 2021-07-28 | End: 2021-07-28

## 2021-07-28 RX ORDER — TETRAKIS(2-METHOXYISOBUTYLISOCYANIDE)COPPER(I) TETRAFLUOROBORATE 1 MG/ML
24.9 INJECTION, POWDER, LYOPHILIZED, FOR SOLUTION INTRAVENOUS ONCE
Status: COMPLETED | OUTPATIENT
Start: 2021-07-28 | End: 2021-07-28

## 2021-07-28 RX ADMIN — REGADENOSON 0.4 MG: 0.08 INJECTION, SOLUTION INTRAVENOUS at 13:56

## 2021-07-28 RX ADMIN — TECHNETIUM TC 99M SESTAMIBI 24.9 MILLICURIE: 1 INJECTION, POWDER, FOR SOLUTION INTRAVENOUS at 14:00

## 2021-07-28 RX ADMIN — TECHNETIUM TC 99M SESTAMIBI 8.3 MILLICURIE: 1 INJECTION, POWDER, FOR SOLUTION INTRAVENOUS at 12:40

## 2021-07-29 ENCOUNTER — TELEPHONE (OUTPATIENT)
Dept: CARDIOLOGY CLINIC | Age: 66
End: 2021-07-29

## 2021-07-29 NOTE — TELEPHONE ENCOUNTER
Called pt, LVM  Per Dr. Laney Mcdowell: \"Nuc 7/28, Can you please call with normal results. Thanks. \"

## 2021-07-29 NOTE — TELEPHONE ENCOUNTER
Returned call,  Per Dr. Fadumo Hess: \"Nuc 7/28, Can you please call with normal results. Thanks. \"  Confirmed NOV:  Future Appointments   Date Time Provider Geetha Sasha   11/3/2021 10:00 AM MD PAUL Pillai AMB

## 2021-11-10 ENCOUNTER — OFFICE VISIT (OUTPATIENT)
Dept: CARDIOLOGY CLINIC | Age: 66
End: 2021-11-10
Payer: MEDICARE

## 2021-11-10 VITALS
HEART RATE: 57 BPM | DIASTOLIC BLOOD PRESSURE: 78 MMHG | SYSTOLIC BLOOD PRESSURE: 136 MMHG | BODY MASS INDEX: 35.93 KG/M2 | HEIGHT: 70 IN | WEIGHT: 251 LBS | OXYGEN SATURATION: 98 %

## 2021-11-10 DIAGNOSIS — E78.2 MIXED HYPERLIPIDEMIA: ICD-10-CM

## 2021-11-10 DIAGNOSIS — I48.91 ATRIAL FIBRILLATION WITH SLOW VENTRICULAR RESPONSE (HCC): ICD-10-CM

## 2021-11-10 DIAGNOSIS — I25.119 ATHEROSCLEROSIS OF NATIVE CORONARY ARTERY OF NATIVE HEART WITH ANGINA PECTORIS (HCC): Primary | ICD-10-CM

## 2021-11-10 PROCEDURE — G8536 NO DOC ELDER MAL SCRN: HCPCS | Performed by: SPECIALIST

## 2021-11-10 PROCEDURE — G8417 CALC BMI ABV UP PARAM F/U: HCPCS | Performed by: SPECIALIST

## 2021-11-10 PROCEDURE — 3017F COLORECTAL CA SCREEN DOC REV: CPT | Performed by: SPECIALIST

## 2021-11-10 PROCEDURE — G8432 DEP SCR NOT DOC, RNG: HCPCS | Performed by: SPECIALIST

## 2021-11-10 PROCEDURE — G0463 HOSPITAL OUTPT CLINIC VISIT: HCPCS | Performed by: SPECIALIST

## 2021-11-10 PROCEDURE — 1101F PT FALLS ASSESS-DOCD LE1/YR: CPT | Performed by: SPECIALIST

## 2021-11-10 PROCEDURE — G8427 DOCREV CUR MEDS BY ELIG CLIN: HCPCS | Performed by: SPECIALIST

## 2021-11-10 PROCEDURE — 99214 OFFICE O/P EST MOD 30 MIN: CPT | Performed by: SPECIALIST

## 2021-11-10 RX ORDER — EZETIMIBE 10 MG/1
10 TABLET ORAL DAILY
Qty: 90 TABLET | Refills: 3 | Status: SHIPPED | OUTPATIENT
Start: 2021-11-10 | End: 2022-05-18

## 2021-11-10 NOTE — PROGRESS NOTES
Matilde Lindsay is a 74 y.o. male    Visit Vitals  /78 (BP 1 Location: Left upper arm, BP Patient Position: Sitting, BP Cuff Size: Adult)   Pulse (!) 57   Ht 5' 10\" (1.778 m)   Wt 251 lb (113.9 kg)   SpO2 98%   BMI 36.01 kg/m²       Chief Complaint   Patient presents with    CHF    Other     S/P CABG X3    Abnormal EKG    Other     ATHERO       Chest pain NO  SOB NO  Dizziness NO  Swelling NO  Recent hospital visit NO  Refills NO  COVID VACCINE STATUS YES  HAD COVID?  YES

## 2021-11-10 NOTE — PROGRESS NOTES
Naeem Marsh MD. [facility]              Patient: Salazar Salgado  : 1955      Today's Date: 11/10/2021          HISTORY OF PRESENT ILLNESS:     History of Present Illness:  Here for routine FU. No CP or SOB. No dizziness. He feels well. No bleeding. Has Aches and pains. PAST MEDICAL HISTORY:     Past Medical History:   Diagnosis Date    Atrial fibrillation with slow ventricular response ([address] 12/10/2016    Benign hypertensive heart disease without heart failure     Coronary atherosclerosis of native coronary artery     presented with NSTEMI, s/p CABG 5874    Metabolic disorder mixed hyperlipidemia     S/P CABG x 3     LIMA-LAD, VG-OM, VG-PDA    Type II or unspecified type diabetes mellitus without mention of complication, not stated as uncontrolled              MEDICATIONS:     Current Outpatient Medications   Medication Sig Dispense Refill    apixaban (ELIQUIS) 5 mg tablet Take 1 Tablet by mouth two (2) times a day. 56 Tablet 0    carvediloL (COREG) 3.125 mg tablet Take 1 Tab by mouth two (2) times daily (with meals). 40 Tab 0    aspirin delayed-release 81 mg tablet Take 1 Tab by mouth daily. Indications: myocardial infarction prevention 30 Tab 6    testosterone (ANDROGEL) 1.25 gram/ actuation (1 %) glpm 20.25 mg by TransDERmal route See Admin Instructions. 3 pumps daily      cyclobenzaprine (FLEXERIL) 10 mg tablet Take  by mouth three (3) times daily as needed for Muscle Spasm(s).  tamsulosin (FLOMAX) 0.4 mg capsule Take 0.4 mg by mouth two (2) times a day.  HYDROcodone-acetaminophen (NORCO) 5-325 mg per tablet Take  by mouth every eight (8) hours as needed for Pain.  magnesium oxide (MAG-OX) 400 mg tablet Take 400 mg by mouth daily.  metFORMIN (GLUCOPHAGE) 500 mg tablet Take  by mouth two (2) times daily (with meals).  fenofibric acid (TRILIPIX) 135 mg capsule Take  by mouth daily.       sildenafil citrate (VIAGRA) 100 mg tablet Take 100 mg by mouth as needed.  ergocalciferol (VITAMIN D2) 50,000 unit capsule Take 50,000 Units by mouth every seven (7) days.  amlodipine (NORVASC) 10 mg tablet Take  by mouth daily.  clonidine (CATAPRESS) 0.2 mg tablet Take  by mouth two (2) times a day.  rosuvastatin (CRESTOR) 20 mg tablet Take 20 mg by mouth nightly.  DOCOSAHEXANOIC ACID/EPA (FISH OIL PO) Take  by mouth two (2) times a day.  FERROUS FUMARATE (IRON PO) Take 1 Tab by mouth as needed. Allergies   Allergen Reactions    Vitamin D [Cholecalciferol (Vitamin D3)] Hives     Large doses           SOCIAL HISTORY:     Social History     Tobacco Use    Smoking status: Never Smoker    Smokeless tobacco: Never Used   Substance Use Topics    Alcohol use: Yes     Comment: twice a mo    Drug use: Not Currently         FAMILY HISTORY:     Family History   Problem Relation Age of Onset    Heart Attack Father                REVIEW OF SYMPTOMS:     Review of Symptoms:  Constitutional: Negative for fever, chills  HEENT: Negative for nosebleeds, tinnitus, and vision changes. Respiratory: Negative for cough, wheezing  Cardiovascular: Negative for orthopnea, claudication, syncope, and PND. Gastrointestinal: Negative for abdominal pain, diarrhea, melena. Genitourinary: Negative for dysuria  Musculoskeletal: Negative for myalgias. + joint pain   Skin: Negative for rash  Heme: No problems bleeding. Neurological: Negative for speech change and focal weakness. PHYSICAL EXAM:     Physical Exam:  Visit Vitals  /78 (BP 1 Location: Left upper arm, BP Patient Position: Sitting, BP Cuff Size: Adult)   Pulse (!) 57   Ht 5' 10\" (1.778 m)   Wt 251 lb (113.9 kg)   SpO2 98%   BMI 36.01 kg/m²     Patient appears generally well, mood and affect are appropriate and pleasant. HEENT:  Hearing intact, non-icteric, normocephalic, atraumatic. Neck Exam: Supple, No JVD or carotid bruits.    Lung Exam: Clear to auscultation, even breath sounds. Cardiac Exam: Irregularly irregular no murmur or rub  Abdomen: Soft, non-tender, normal bowel sounds. obese  Extremities: Moves all ext well. No lower extremity edema. MSKTL: Overall good ROM ext  Skin: No significant rashes  Psych: Appropriate affect  Neuro - Grossly intact      LABS / OTHER STUDIES reviewed:       Lab Results   Component Value Date/Time    Bilirubin, total 0.6 07/28/2009 10:21 AM    Alk. phosphatase 68 07/28/2009 10:21 AM    Protein, total 8.3 (H) 07/28/2009 10:21 AM    Albumin 4.6 07/28/2009 10:21 AM    Globulin 3.7 07/28/2009 10:21 AM    A-G Ratio 1.2 07/28/2009 10:21 AM    ALT (SGPT) 39 07/28/2009 10:21 AM    AST (SGOT) 19 07/28/2009 10:21 AM         Lab Results   Component Value Date/Time    Cholesterol, total 193 07/28/2009 10:21 AM    HDL Cholesterol 47 07/28/2009 10:21 AM    LDL, calculated 131.4 (H) 07/28/2009 10:21 AM    VLDL, calculated 14.6 07/28/2009 10:21 AM    Triglyceride 73 07/28/2009 10:21 AM    CHOL/HDL Ratio 4.1 07/28/2009 10:21 AM       Lasb 2/21 - Apob 118, proBNP 287, CMP OK,   Labs 10/26/21 - CBC OK, CMP OK, chol 171, , HDL 45       CARDIAC DIAGNOSTICS:     Cardiac Evaluation Includes:  I reviewed the results below. Stress echo 5/2011 - 8 minutes, normal, resting EF 60%  CT 8/29/13 - 138   Echo 12/14/16 - EF 55-60%. No WMA. Wall thickness is mild to moderately increased. Mild LVH. LA mildly dilated. AoV sclerosis without stenosis.     Exercise Cardiolite 6/9/20- 6:36 min, normal perfusion, EF 55%    Lexiscan Cadiolite 7/27/21 - MPI is probably normal. Myocardial perfusion imaging supports a low risk stress test.  LVEF 54%     EKG 2/3/21 - Afib, HR 44 bpm   EKG 5/5/21 - Afib,       ASSESSMENT AND PLAN:     Assessment and Plan:    1) CAD s/p CABG 2006.    - Exercise Cardiolite June 2020 normal.   - He has no exertional sxs but is fairly sedentary   - Continue OAC, BB, statin   - Does not need ASA since on Eliquis      2) Chronic AF w/ controlled VR on no AV edgar medications. - Had metoprolol stopped in 2/20 due to bradycardia but he is on low dose Coreg when I see him 5/5/21   - cont Coreg and Eliquis      3) Chronic HTN,  - BP OK at home  - Continue meds and home BP monitoring     4) Dyslipidemia/prediabetes managed by Purnima Deluna MD  - on a statin and fibrate   - On 11/21 -  ---> add Zetia 10 mg daily   ---> If LDL remains high next visit, then can consider Repatha (LDL goal < 70)     5) See me in 6 months.         Lives with wife. Retired from [employer]. Verta Holstein, MD, [place] [id number] [facility] [address].  [address], [facility] N. Rom Batista.  Daljit, [address] [place] [address]  Ph: [phone]   Ph [phone]

## 2022-02-09 LAB
LDL-C, EXTERNAL: 79.3
MICROALBUMIN UR TEST STR-MCNC: 1.2 MG/DL
SARS-COV-2 AB, IGG, CORG1M: REACTIVE

## 2022-03-19 PROBLEM — E66.01 SEVERE OBESITY (HCC): Status: ACTIVE | Noted: 2020-02-23

## 2022-04-26 LAB
CREATININE, EXTERNAL: 1
HBA1C MFR BLD HPLC: 5.6 %
PSA, EXTERNAL: 4.4

## 2022-05-18 ENCOUNTER — OFFICE VISIT (OUTPATIENT)
Dept: CARDIOLOGY CLINIC | Age: 67
End: 2022-05-18
Payer: MEDICARE

## 2022-05-18 VITALS
HEART RATE: 78 BPM | HEIGHT: 70 IN | SYSTOLIC BLOOD PRESSURE: 118 MMHG | OXYGEN SATURATION: 98 % | WEIGHT: 247 LBS | BODY MASS INDEX: 35.36 KG/M2 | DIASTOLIC BLOOD PRESSURE: 78 MMHG

## 2022-05-18 DIAGNOSIS — I48.91 ATRIAL FIBRILLATION WITH SLOW VENTRICULAR RESPONSE (HCC): ICD-10-CM

## 2022-05-18 DIAGNOSIS — I25.119 ATHEROSCLEROSIS OF NATIVE CORONARY ARTERY OF NATIVE HEART WITH ANGINA PECTORIS (HCC): Primary | ICD-10-CM

## 2022-05-18 DIAGNOSIS — Z95.1 S/P CABG X 3: ICD-10-CM

## 2022-05-18 DIAGNOSIS — I11.9 BENIGN HYPERTENSIVE HEART DISEASE WITHOUT HEART FAILURE: ICD-10-CM

## 2022-05-18 PROCEDURE — G8536 NO DOC ELDER MAL SCRN: HCPCS | Performed by: SPECIALIST

## 2022-05-18 PROCEDURE — 99214 OFFICE O/P EST MOD 30 MIN: CPT | Performed by: SPECIALIST

## 2022-05-18 PROCEDURE — 1101F PT FALLS ASSESS-DOCD LE1/YR: CPT | Performed by: SPECIALIST

## 2022-05-18 PROCEDURE — G8427 DOCREV CUR MEDS BY ELIG CLIN: HCPCS | Performed by: SPECIALIST

## 2022-05-18 PROCEDURE — G0463 HOSPITAL OUTPT CLINIC VISIT: HCPCS | Performed by: SPECIALIST

## 2022-05-18 PROCEDURE — G8432 DEP SCR NOT DOC, RNG: HCPCS | Performed by: SPECIALIST

## 2022-05-18 PROCEDURE — 3017F COLORECTAL CA SCREEN DOC REV: CPT | Performed by: SPECIALIST

## 2022-05-18 PROCEDURE — G8417 CALC BMI ABV UP PARAM F/U: HCPCS | Performed by: SPECIALIST

## 2022-05-18 RX ORDER — AMLODIPINE BESYLATE 5 MG/1
5 TABLET ORAL DAILY
Qty: 90 TABLET | Refills: 3 | Status: SHIPPED | OUTPATIENT
Start: 2022-05-18

## 2022-05-18 RX ORDER — TIMOLOL MALEATE 5 MG/ML
SOLUTION/ DROPS OPHTHALMIC
COMMUNITY
Start: 2022-01-27

## 2022-05-18 RX ORDER — EZETIMIBE 10 MG/1
10 TABLET ORAL DAILY
COMMUNITY

## 2022-05-18 NOTE — PROGRESS NOTES
Virgen Marquez MD. [facility] [place]              Patient: Ted Garcia  : 1955      Today's Date: 2022          HISTORY OF PRESENT ILLNESS:     History of Present Illness:  Here for routine FU. No CP or SOB. No dizziness. He feels well. No bleeding. Has joint aches and pains. PAST MEDICAL HISTORY:     Past Medical History:   Diagnosis Date    Atrial fibrillation with slow ventricular response ([address] 12/10/2016    Benign hypertensive heart disease without heart failure     Coronary atherosclerosis of native coronary artery     presented with NSTEMI, s/p CABG 3081    Metabolic disorder mixed hyperlipidemia     S/P CABG x 3     LIMA-LAD, VG-OM, VG-PDA    Type II or unspecified type diabetes mellitus without mention of complication, not stated as uncontrolled              MEDICATIONS:     Current Outpatient Medications   Medication Sig Dispense Refill    timolol (TIMOPTIC) 0.5 % ophthalmic solution INSTILL 1 DROP INTO BOTH EYES IN THE MORNING      ezetimibe (Zetia) 10 mg tablet Take 10 mg by mouth daily.  apixaban (ELIQUIS) 5 mg tablet Take 1 Tablet by mouth two (2) times a day. 56 Tablet 0    carvediloL (COREG) 3.125 mg tablet Take 1 Tab by mouth two (2) times daily (with meals). 40 Tab 0    tamsulosin (FLOMAX) 0.4 mg capsule Take 0.4 mg by mouth two (2) times a day.  fenofibric acid (TRILIPIX) 135 mg capsule Take  by mouth daily.  sildenafil citrate (VIAGRA) 100 mg tablet Take 100 mg by mouth as needed.  ergocalciferol (VITAMIN D2) 50,000 unit capsule Take 50,000 Units by mouth every seven (7) days.  amlodipine (NORVASC) 10 mg tablet Take  by mouth daily.  clonidine (CATAPRESS) 0.2 mg tablet Take  by mouth two (2) times a day.  rosuvastatin (CRESTOR) 20 mg tablet Take 20 mg by mouth nightly.  DOCOSAHEXANOIC ACID/EPA (FISH OIL PO) Take  by mouth two (2) times a day.  FERROUS FUMARATE (IRON PO) Take 1 Tab by mouth as needed.       testosterone (ANDROGEL) 1.25 gram/ actuation (1 %) glpm 20.25 mg by TransDERmal route See Admin Instructions. 3 pumps daily (Patient not taking: Reported on 5/18/2022)      magnesium oxide (MAG-OX) 400 mg tablet Take 400 mg by mouth daily.  metFORMIN (GLUCOPHAGE) 500 mg tablet Take  by mouth two (2) times daily (with meals). (Patient not taking: Reported on 5/18/2022)         Allergies   Allergen Reactions    Vitamin D [Cholecalciferol (Vitamin D3)] Hives     Large doses           SOCIAL HISTORY:     Social History     Tobacco Use    Smoking status: Never Smoker    Smokeless tobacco: Never Used   Substance Use Topics    Alcohol use: Yes     Comment: twice a mo    Drug use: Not Currently         FAMILY HISTORY:     Family History   Problem Relation Age of Onset    Heart Attack Father                REVIEW OF SYMPTOMS:     Review of Symptoms:  Constitutional: Negative for fever, chills  HEENT: Negative for nosebleeds, tinnitus, and vision changes. Respiratory: Negative for cough, wheezing  Cardiovascular: Negative for orthopnea, claudication, syncope, and PND. Gastrointestinal: Negative for abdominal pain, diarrhea, melena. Genitourinary: Negative for dysuria  Musculoskeletal: Negative for myalgias. + joint pain   Skin: Negative for rash  Heme: No problems bleeding. Neurological: Negative for speech change and focal weakness. PHYSICAL EXAM:     Physical Exam:  Visit Vitals  /78 (BP 1 Location: Left upper arm, BP Patient Position: Sitting)   Pulse 78   Ht 5' 10\" (1.778 m)   Wt 247 lb (112 kg)   SpO2 98%   BMI 35.44 kg/m²     Patient appears generally well, mood and affect are appropriate and pleasant. HEENT:  Hearing intact, non-icteric, normocephalic, atraumatic. Neck Exam: Supple, No JVD or carotid bruits. Lung Exam: Clear to auscultation, even breath sounds. Cardiac Exam: Irregularly irregular no murmur or rub  Abdomen: Soft, non-tender, normal bowel sounds. obese  Extremities: Moves all ext well. Mild bilat lower extremity edema. MSKTL: Overall good ROM ext  Skin: No significant rashes  Psych: Appropriate affect  Neuro - Grossly intact      LABS / OTHER STUDIES reviewed:       Lab Results   Component Value Date/Time    Bilirubin, total 0.6 07/28/2009 10:21 AM    Alk. phosphatase 68 07/28/2009 10:21 AM    Protein, total 8.3 (H) 07/28/2009 10:21 AM    Albumin 4.6 07/28/2009 10:21 AM    Globulin 3.7 07/28/2009 10:21 AM    A-G Ratio 1.2 07/28/2009 10:21 AM    ALT (SGPT) 39 07/28/2009 10:21 AM    AST (SGOT) 19 07/28/2009 10:21 AM         Lab Results   Component Value Date/Time    Cholesterol, total 193 07/28/2009 10:21 AM    HDL Cholesterol 47 07/28/2009 10:21 AM    LDL, calculated 131.4 (H) 07/28/2009 10:21 AM    VLDL, calculated 14.6 07/28/2009 10:21 AM    Triglyceride 73 07/28/2009 10:21 AM    CHOL/HDL Ratio 4.1 07/28/2009 10:21 AM       Lasb 2/21 - Apob 118, proBNP 287, CMP OK,   Labs 10/26/21 - CBC OK, CMP OK, chol 171, , HDL 45       CARDIAC DIAGNOSTICS:     Cardiac Evaluation Includes:  I reviewed the results below. Stress echo 5/2011 - 8 minutes, normal, resting EF 60%  CT 8/29/13 - 138   Echo 12/14/16 - EF 55-60%. No WMA. Wall thickness is mild to moderately increased. Mild LVH. LA mildly dilated. AoV sclerosis without stenosis.     Exercise Cardiolite 6/9/20- 6:36 min, normal perfusion, EF 55%    Lexiscan Cadiolite 7/27/21 - MPI is probably normal. Myocardial perfusion imaging supports a low risk stress test.  LVEF 54%     EKG 2/3/21 - Afib, HR 44 bpm   EKG 5/5/21 - Afib,       ASSESSMENT AND PLAN:     Assessment and Plan:    1) CAD s/p CABG 2006. - Exercise Cardiolite June 2020 normal.   - He has no exertional sxs but is fairly sedentary   - Continue OAC, BB, statin      2) Chronic AF w/ controlled VR on no AV edgar medications.    - Had metoprolol stopped in 2/20 due to bradycardia but he is on low dose Coreg when I see him 5/5/21   - cont Coreg and Eliquis      3) Chronic HTN,  - BP OK at home  - Continue meds and home BP monitoring  - On 5/18/22, he had some LE edema ---> will cut back norvasc and see how he does -- also check an echo -- if problems persist, he is to let us know     4) Dyslipidemia/prediabetes managed by Becky Hardy MD  - on a statin and fibrate on 11/21 his LDL was 128 ---> added Zetia 10 mg daily on 11/21  ---> he is to review labs with Dr. Dante Choi soon ---> If LDL remains high, then can consider Repatha (LDL goal < 70)     5) See me in 6 months.       Lives with wife. Kids live close by. Retired from [employer]. Sharyle Sheen, MD, [place] [address]. [address], [address] [place] [address]  Ph: [phone]   Ph [phone]      ADDENDUM   6/15/2022    Echo 6/15/22 - mod LVH, LVEF 60-65%. AV sclerosis.   Asc Ao 4.0 cm  Mild LAE

## 2022-05-18 NOTE — PROGRESS NOTES
Chief Complaint   Patient presents with    Follow-up     6 mo     Hypertension    Cholesterol Problem    Irregular Heart Beat     A-Fib     Visit Vitals  /78 (BP 1 Location: Left upper arm, BP Patient Position: Sitting)   Pulse 78   Ht 5' 10\" (1.778 m)   Wt 247 lb (112 kg)   SpO2 98%   BMI 35.44 kg/m²     Chest pain denied   SOB denied   Palpitations denied   Swelling in hands/feet Yes   Dizziness denied   Recent hospital stays denied   Lab done by pcp in April. req for results has  been placed.    Refills denied

## 2022-05-18 NOTE — PROGRESS NOTES
Samples per Dr. Jasmeet Dunne VO of Eliquis 5 mg BID. Orders for Check an echo when possible. See me in 6 months    per Dr. Jasmeet Dunne VO.   Dx: cad, afib, htn

## 2022-06-15 ENCOUNTER — ANCILLARY PROCEDURE (OUTPATIENT)
Dept: CARDIOLOGY CLINIC | Age: 67
End: 2022-06-15
Payer: MEDICARE

## 2022-06-15 VITALS
HEIGHT: 70 IN | BODY MASS INDEX: 35.36 KG/M2 | SYSTOLIC BLOOD PRESSURE: 122 MMHG | DIASTOLIC BLOOD PRESSURE: 80 MMHG | WEIGHT: 247 LBS

## 2022-06-15 DIAGNOSIS — I48.91 ATRIAL FIBRILLATION WITH SLOW VENTRICULAR RESPONSE (HCC): ICD-10-CM

## 2022-06-15 DIAGNOSIS — I25.119 ATHEROSCLEROSIS OF NATIVE CORONARY ARTERY OF NATIVE HEART WITH ANGINA PECTORIS (HCC): ICD-10-CM

## 2022-06-15 DIAGNOSIS — Z95.1 S/P CABG X 3: ICD-10-CM

## 2022-06-15 DIAGNOSIS — I11.9 BENIGN HYPERTENSIVE HEART DISEASE WITHOUT HEART FAILURE: ICD-10-CM

## 2022-06-15 LAB
ECHO AO ASC DIAM: 4 CM
ECHO AO ASCENDING AORTA INDEX: 1.75 CM/M2
ECHO AO ROOT DIAM: 3.9 CM
ECHO AO ROOT INDEX: 1.71 CM/M2
ECHO AV MEAN GRADIENT: 5 MMHG
ECHO AV MEAN VELOCITY: 1 M/S
ECHO AV PEAK GRADIENT: 8 MMHG
ECHO AV PEAK VELOCITY: 1.4 M/S
ECHO AV VELOCITY RATIO: 0.64
ECHO AV VTI: 26.5 CM
ECHO LA DIAMETER INDEX: 2.32 CM/M2
ECHO LA DIAMETER: 5.3 CM
ECHO LA TO AORTIC ROOT RATIO: 1.36
ECHO LA VOL 2C: 64 ML (ref 18–58)
ECHO LA VOL 4C: 76 ML (ref 18–58)
ECHO LA VOL BP: 74 ML (ref 18–58)
ECHO LA VOL/BSA BIPLANE: 32 ML/M2 (ref 16–34)
ECHO LA VOLUME AREA LENGTH: 80 ML
ECHO LA VOLUME INDEX A2C: 28 ML/M2 (ref 16–34)
ECHO LA VOLUME INDEX A4C: 33 ML/M2 (ref 16–34)
ECHO LA VOLUME INDEX AREA LENGTH: 35 ML/M2 (ref 16–34)
ECHO LV E' LATERAL VELOCITY: 8 CM/S
ECHO LV FRACTIONAL SHORTENING: 36 % (ref 28–44)
ECHO LV INTERNAL DIMENSION DIASTOLE INDEX: 2.19 CM/M2
ECHO LV INTERNAL DIMENSION DIASTOLIC: 5 CM (ref 4.2–5.9)
ECHO LV INTERNAL DIMENSION SYSTOLIC INDEX: 1.4 CM/M2
ECHO LV INTERNAL DIMENSION SYSTOLIC: 3.2 CM
ECHO LV IVSD: 1.5 CM (ref 0.6–1)
ECHO LV MASS 2D: 306.8 G (ref 88–224)
ECHO LV MASS INDEX 2D: 134.6 G/M2 (ref 49–115)
ECHO LV POSTERIOR WALL DIASTOLIC: 1.4 CM (ref 0.6–1)
ECHO LV RELATIVE WALL THICKNESS RATIO: 0.56
ECHO LVOT AV VTI INDEX: 0.57
ECHO LVOT MEAN GRADIENT: 2 MMHG
ECHO LVOT PEAK GRADIENT: 3 MMHG
ECHO LVOT PEAK VELOCITY: 0.9 M/S
ECHO LVOT VTI: 15.2 CM
ECHO RV TAPSE: 2 CM (ref 1.7–?)

## 2022-06-15 PROCEDURE — 93306 TTE W/DOPPLER COMPLETE: CPT | Performed by: SPECIALIST

## 2022-06-29 ENCOUNTER — TELEPHONE (OUTPATIENT)
Dept: CARDIOLOGY CLINIC | Age: 67
End: 2022-06-29

## 2022-07-05 NOTE — TELEPHONE ENCOUNTER
Spoke to pt,  Per Dr. Borrego Button: \"Echo 6/15/22 - mod LVH, LVEF 60-65%. AV sclerosis. Asc Ao 4.0 cm  Mild LAE\"  Reviewed results from echo. Discussed keeping cholesterol and HTN under control, exercising regularly to help maintain heart health.

## 2022-11-15 ENCOUNTER — OFFICE VISIT (OUTPATIENT)
Dept: CARDIOLOGY CLINIC | Age: 67
End: 2022-11-15
Payer: MEDICARE

## 2022-11-15 VITALS
DIASTOLIC BLOOD PRESSURE: 84 MMHG | HEIGHT: 70 IN | HEART RATE: 62 BPM | WEIGHT: 253 LBS | OXYGEN SATURATION: 98 % | BODY MASS INDEX: 36.22 KG/M2 | SYSTOLIC BLOOD PRESSURE: 132 MMHG

## 2022-11-15 DIAGNOSIS — I25.118 ATHEROSCLEROSIS OF NATIVE CORONARY ARTERY OF NATIVE HEART WITH STABLE ANGINA PECTORIS (HCC): ICD-10-CM

## 2022-11-15 DIAGNOSIS — I48.91 ATRIAL FIBRILLATION WITH SLOW VENTRICULAR RESPONSE (HCC): Primary | ICD-10-CM

## 2022-11-15 DIAGNOSIS — E66.01 SEVERE OBESITY (HCC): ICD-10-CM

## 2022-11-15 DIAGNOSIS — I11.9 BENIGN HYPERTENSIVE HEART DISEASE WITHOUT HEART FAILURE: ICD-10-CM

## 2022-11-15 DIAGNOSIS — Z95.1 S/P CABG X 3: ICD-10-CM

## 2022-11-15 PROCEDURE — 1123F ACP DISCUSS/DSCN MKR DOCD: CPT | Performed by: SPECIALIST

## 2022-11-15 PROCEDURE — G8417 CALC BMI ABV UP PARAM F/U: HCPCS | Performed by: SPECIALIST

## 2022-11-15 PROCEDURE — G0463 HOSPITAL OUTPT CLINIC VISIT: HCPCS | Performed by: SPECIALIST

## 2022-11-15 PROCEDURE — 93005 ELECTROCARDIOGRAM TRACING: CPT | Performed by: SPECIALIST

## 2022-11-15 PROCEDURE — 1101F PT FALLS ASSESS-DOCD LE1/YR: CPT | Performed by: SPECIALIST

## 2022-11-15 PROCEDURE — G8427 DOCREV CUR MEDS BY ELIG CLIN: HCPCS | Performed by: SPECIALIST

## 2022-11-15 PROCEDURE — 99214 OFFICE O/P EST MOD 30 MIN: CPT | Performed by: SPECIALIST

## 2022-11-15 PROCEDURE — G8510 SCR DEP NEG, NO PLAN REQD: HCPCS | Performed by: SPECIALIST

## 2022-11-15 PROCEDURE — 3017F COLORECTAL CA SCREEN DOC REV: CPT | Performed by: SPECIALIST

## 2022-11-15 PROCEDURE — 93010 ELECTROCARDIOGRAM REPORT: CPT | Performed by: SPECIALIST

## 2022-11-15 PROCEDURE — G8536 NO DOC ELDER MAL SCRN: HCPCS | Performed by: SPECIALIST

## 2022-11-15 NOTE — PROGRESS NOTES
Chief Complaint   Patient presents with    Follow-up     6month    Cholesterol Problem    Irregular Heart Beat       Vitals:    11/15/22 0959   BP: 132/84   Pulse: 62   Height: 5' 10\" (1.778 m)   Weight: 253 lb (114.8 kg)   SpO2: 98%         Chest pain: no    SOB: no    Palpitations: no    Dizziness: no    Swelling: feet    Refills:       Have you been to the ER, urgent care clinic since your last visit? Hospitalized since your last visit? Have you seen or consulted other health care providers outside of the [facility] since your last visit?  (Include any pap smears or colon screening.)

## 2022-11-15 NOTE — PROGRESS NOTES
Tracee Hdz MD. [address] [facility]              Patient: Quynh Clark  : 1955      Today's Date: 11/15/2022          HISTORY OF PRESENT ILLNESS:     History of Present Illness:  Here for routine FU. No CP or sig SOB (class 2). No dizziness. He feels well. No bleeding. Has joint aches and pains. PAST MEDICAL HISTORY:     Past Medical History:   Diagnosis Date    Atrial fibrillation with slow ventricular response ([address] 12/10/2016    Benign hypertensive heart disease without heart failure     Coronary atherosclerosis of native coronary artery     presented with NSTEMI, s/p CABG 0775    Metabolic disorder mixed hyperlipidemia     S/P CABG x 3     LIMA-LAD, VG-OM, VG-PDA    Type II or unspecified type diabetes mellitus without mention of complication, not stated as uncontrolled              MEDICATIONS:     Current Outpatient Medications   Medication Sig Dispense Refill    timolol (TIMOPTIC) 0.5 % ophthalmic solution INSTILL 1 DROP INTO BOTH EYES IN THE MORNING      ezetimibe (ZETIA) 10 mg tablet Take 10 mg by mouth daily. amLODIPine (Norvasc) 5 mg tablet Take 1 Tablet by mouth daily. 90 Tablet 3    apixaban (ELIQUIS) 5 mg tablet Take 1 Tablet by mouth two (2) times a day. 56 Tablet 0    carvediloL (COREG) 3.125 mg tablet Take 1 Tab by mouth two (2) times daily (with meals). 40 Tab 0    testosterone 12.5 mg/ 1.25 gram (1 %) glpm 20.25 mg by TransDERmal route See Admin Instructions. 3 pumps daily      tamsulosin (FLOMAX) 0.4 mg capsule Take 0.4 mg by mouth two (2) times a day. magnesium oxide (MAG-OX) 400 mg tablet Take 400 mg by mouth daily. metFORMIN (GLUCOPHAGE) 500 mg tablet Take  by mouth two (2) times daily (with meals). fenofibric acid (TRILIPIX ER) 135 mg capsule Take  by mouth daily. sildenafil citrate (VIAGRA) 100 mg tablet Take 100 mg by mouth as needed.       ergocalciferol (ERGOCALCIFEROL) 1,250 mcg (50,000 unit) capsule Take 50,000 Units by mouth every seven (7) days. clonidine (CATAPRESS) 0.2 mg tablet Take  by mouth two (2) times a day. rosuvastatin (CRESTOR) 20 mg tablet Take 20 mg by mouth nightly. DOCOSAHEXANOIC ACID/EPA (FISH OIL PO) Take  by mouth two (2) times a day. FERROUS FUMARATE (IRON PO) Take 1 Tab by mouth as needed. Allergies   Allergen Reactions    Vitamin D [Cholecalciferol (Vitamin D3)] Hives     Large doses           SOCIAL HISTORY:     Social History     Tobacco Use    Smoking status: Never    Smokeless tobacco: Never   Substance Use Topics    Alcohol use: Yes     Comment: twice a mo    Drug use: Not Currently         FAMILY HISTORY:     Family History   Problem Relation Age of Onset    Heart Attack Father                REVIEW OF SYMPTOMS:     Review of Symptoms:  Constitutional: Negative for fever, chills  HEENT: Negative for nosebleeds, tinnitus, and vision changes. Respiratory: Negative for cough, wheezing  Cardiovascular: Negative for orthopnea, claudication, syncope, and PND. Gastrointestinal: Negative for abdominal pain, diarrhea, melena. Genitourinary: Negative for dysuria  Musculoskeletal: Negative for myalgias. + joint pain   Skin: Negative for rash  Heme: No problems bleeding. Neurological: Negative for speech change and focal weakness. PHYSICAL EXAM:     Physical Exam:  Visit Vitals  Ht 5' 10\" (1.778 m)   Wt 253 lb (114.8 kg)   BMI 36.30 kg/m²     Patient appears generally well, mood and affect are appropriate and pleasant. HEENT:  Hearing intact, non-icteric, normocephalic, atraumatic. Neck Exam: Supple, No JVD   Lung Exam: Clear to auscultation, even breath sounds. Cardiac Exam: Irregularly irregular no murmur or rub  Abdomen: Soft, non-tender, normal bowel sounds. obese  Extremities: Moves all ext well. No lower extremity edema.   MSKTL: Overall good ROM ext  Skin: No significant rashes  Psych: Appropriate affect  Neuro - Grossly intact      LABS / OTHER STUDIES reviewed: Lab Results   Component Value Date/Time    Bilirubin, total 0.6 07/28/2009 10:21 AM    Alk. phosphatase 68 07/28/2009 10:21 AM    Protein, total 8.3 (H) 07/28/2009 10:21 AM    Albumin 4.6 07/28/2009 10:21 AM    Globulin 3.7 07/28/2009 10:21 AM    A-G Ratio 1.2 07/28/2009 10:21 AM    ALT (SGPT) 39 07/28/2009 10:21 AM    AST (SGOT) 19 07/28/2009 10:21 AM         Lab Results   Component Value Date/Time    Cholesterol, total 193 07/28/2009 10:21 AM    HDL Cholesterol 47 07/28/2009 10:21 AM    LDL, calculated 131.4 (H) 07/28/2009 10:21 AM    VLDL, calculated 14.6 07/28/2009 10:21 AM    Triglyceride 73 07/28/2009 10:21 AM    CHOL/HDL Ratio 4.1 07/28/2009 10:21 AM       Lasb 2/21 - Apob 118, proBNP 287, CMP OK,   Labs 10/26/21 - CBC OK, CMP OK, chol 171, , HDL 45       CARDIAC DIAGNOSTICS:     Cardiac Evaluation Includes:  I reviewed the results below. Stress echo 5/2011 - 8 minutes, normal, resting EF 60%  CT 8/29/13 - CAC score 138   Echo 12/14/16 - EF 55-60%. No WMA. Wall thickness is mild to moderately increased. Mild LVH. LA mildly dilated. AoV sclerosis without stenosis. Exercise Cardiolite 6/9/20- 6:36 min, normal perfusion, EF 55%    Lexiscan Cadiolite 7/27/21 - MPI is probably normal. Myocardial perfusion imaging supports a low risk stress test.  LVEF 54%     Echo 6/15/22 - mod LVH, LVEF 60-65%. AV sclerosis. Asc Ao 4.0 cm  Mild LAE      EKG 2/3/21 - Afib, HR 44 bpm   EKG 5/5/21 - Afib  EKG 11/15/22 - afib, HR 51      ASSESSMENT AND PLAN:     Assessment and Plan:    1) CAD s/p CABG 2006. - Exercise Cardiolite June 2020 and Lexisiscan cardiolite 7/21  normal.   - Echo 6/15/22 - mod LVH, LVEF 60-65%. AV sclerosis. Asc Ao 4.0 cm  Mild LAE  - chronic class 2 KIM - no sig CP - activity limited by hip problems   - Continue OAC, statin      2) Chronic AF w/ controlled VR on no AV edgar medications.    - Had metoprolol stopped in 2/20 due to bradycardia but he is on low dose Coreg when I see him 5/5/21   - cont Eliquis  - on 11/22 - Will stop coreg as HR runs slow      3) Chronic HTN,  - BP OK at home  - Continue meds and home BP monitoring  - leg swelling better once amlodipine dose cut back      4) Dyslipidemia/prediabetes managed by Paloma Montalvo MD  - on a statin and fibrate on 11/21 his LDL was 128 ---> added Zetia 10 mg daily on 11/21 --> cont meds   ---> labs followed by PCP --- LDL goal at least < 70     5) See me in 6 months. Lives with wife. Kids live close by. Retired from [employer]. Sheba Rangel MD, [place] [address]  [address] [facility], [address]  [place] [address]  [address]  [address]  Ph: [phone]   Ph [phone]

## 2023-04-12 LAB — HBA1C MFR BLD HPLC: 5.8 %

## 2023-04-28 RX ORDER — AMLODIPINE BESYLATE 5 MG/1
TABLET ORAL
Qty: 90 TABLET | Refills: 2 | Status: SHIPPED | OUTPATIENT
Start: 2023-04-28

## 2023-04-28 NOTE — TELEPHONE ENCOUNTER
Refill per VO of Dr. Evelyne Mart  Last appt: 11/15/2022  Future Appointments   Date Time Provider Geetha Baez   6/6/2023 11:40 AM MD PAUL Lugo BS AMB       Requested Prescriptions     Signed Prescriptions Disp Refills    amLODIPine (NORVASC) 5 mg tablet 90 Tablet 2     Sig: TAKE 1 TABLET DAILY     Authorizing Provider: Francine Mccloud     Ordering User: Lindsey Robles

## 2023-06-06 ENCOUNTER — OFFICE VISIT (OUTPATIENT)
Age: 68
End: 2023-06-06
Payer: MEDICARE

## 2023-06-06 VITALS
BODY MASS INDEX: 36.22 KG/M2 | OXYGEN SATURATION: 98 % | HEART RATE: 78 BPM | DIASTOLIC BLOOD PRESSURE: 76 MMHG | SYSTOLIC BLOOD PRESSURE: 120 MMHG | HEIGHT: 70 IN | WEIGHT: 253 LBS

## 2023-06-06 DIAGNOSIS — I25.118 ATHEROSCLEROSIS OF NATIVE CORONARY ARTERY OF NATIVE HEART WITH STABLE ANGINA PECTORIS (HCC): ICD-10-CM

## 2023-06-06 DIAGNOSIS — I11.9 BENIGN HYPERTENSIVE HEART DISEASE WITHOUT HEART FAILURE: ICD-10-CM

## 2023-06-06 DIAGNOSIS — E78.2 MIXED HYPERLIPIDEMIA: ICD-10-CM

## 2023-06-06 DIAGNOSIS — I48.91 ATRIAL FIBRILLATION WITH SLOW VENTRICULAR RESPONSE (HCC): Primary | ICD-10-CM

## 2023-06-06 PROCEDURE — 1036F TOBACCO NON-USER: CPT | Performed by: SPECIALIST

## 2023-06-06 PROCEDURE — G8427 DOCREV CUR MEDS BY ELIG CLIN: HCPCS | Performed by: SPECIALIST

## 2023-06-06 PROCEDURE — 99214 OFFICE O/P EST MOD 30 MIN: CPT | Performed by: SPECIALIST

## 2023-06-06 PROCEDURE — 1123F ACP DISCUSS/DSCN MKR DOCD: CPT | Performed by: SPECIALIST

## 2023-06-06 PROCEDURE — G8417 CALC BMI ABV UP PARAM F/U: HCPCS | Performed by: SPECIALIST

## 2023-06-06 PROCEDURE — 3017F COLORECTAL CA SCREEN DOC REV: CPT | Performed by: SPECIALIST

## 2023-06-06 NOTE — PROGRESS NOTES
Chief Complaint   Patient presents with    Follow-up    Atrial Fibrillation    Hyperlipidemia    Hypertension     Vitals:    06/06/23 1157   BP: 120/76   Site: Left Upper Arm   Position: Sitting   Pulse: 78   SpO2: 98%   Weight: 253 lb (114.8 kg)   Height: 5' 10\" (1.778 m)         Chest pain denied     SOB denied     Palpitations denied     Swelling in hands/feet denied     Dizziness denied     Recent hospital stays denied     Refills denied
[address]  [address] [facility], [address]  [address] [facility]  [address]  [address]  Ph: [phone]   Ph [phone]

## 2023-08-08 ENCOUNTER — TELEPHONE (OUTPATIENT)
Age: 68
End: 2023-08-08

## 2023-08-08 NOTE — TELEPHONE ENCOUNTER
Fax received from [facility] for clearance. Procedure: Prostate biopsy under [facility]  Dr: Susan Sauer  Medication requested to hold: Eliquis/ASA    Fax to: [phone]  Ph: [phone]  Office contact Dain Cleaning.

## 2023-12-05 ENCOUNTER — OFFICE VISIT (OUTPATIENT)
Age: 68
End: 2023-12-05
Payer: MEDICARE

## 2023-12-05 VITALS
DIASTOLIC BLOOD PRESSURE: 80 MMHG | WEIGHT: 246.2 LBS | SYSTOLIC BLOOD PRESSURE: 140 MMHG | HEIGHT: 70 IN | OXYGEN SATURATION: 97 % | HEART RATE: 66 BPM | BODY MASS INDEX: 35.24 KG/M2

## 2023-12-05 DIAGNOSIS — R06.09 DOE (DYSPNEA ON EXERTION): ICD-10-CM

## 2023-12-05 DIAGNOSIS — I48.19 PERSISTENT ATRIAL FIBRILLATION (HCC): Primary | ICD-10-CM

## 2023-12-05 DIAGNOSIS — I25.118 CORONARY ARTERY DISEASE OF NATIVE ARTERY OF NATIVE HEART WITH STABLE ANGINA PECTORIS (HCC): ICD-10-CM

## 2023-12-05 PROCEDURE — G8417 CALC BMI ABV UP PARAM F/U: HCPCS | Performed by: SPECIALIST

## 2023-12-05 PROCEDURE — 3017F COLORECTAL CA SCREEN DOC REV: CPT | Performed by: SPECIALIST

## 2023-12-05 PROCEDURE — G8484 FLU IMMUNIZE NO ADMIN: HCPCS | Performed by: SPECIALIST

## 2023-12-05 PROCEDURE — 99214 OFFICE O/P EST MOD 30 MIN: CPT | Performed by: SPECIALIST

## 2023-12-05 PROCEDURE — G8427 DOCREV CUR MEDS BY ELIG CLIN: HCPCS | Performed by: SPECIALIST

## 2023-12-05 PROCEDURE — 93010 ELECTROCARDIOGRAM REPORT: CPT | Performed by: SPECIALIST

## 2023-12-05 PROCEDURE — 1036F TOBACCO NON-USER: CPT | Performed by: SPECIALIST

## 2023-12-05 PROCEDURE — 1123F ACP DISCUSS/DSCN MKR DOCD: CPT | Performed by: SPECIALIST

## 2023-12-05 PROCEDURE — 93005 ELECTROCARDIOGRAM TRACING: CPT | Performed by: SPECIALIST

## 2023-12-05 NOTE — PROGRESS NOTES
Colby Hanson MD. [facility]          Patient: Jeri Bangura. : 1955      Today's Date: 2023        HISTORY OF PRESENT ILLNESS:     History of Present Illness:    Doing OK overall. No new cardiac complaints. Class 1-2 PLATA. No sig CP  - occ slight \"twinge\". PAST MEDICAL HISTORY:     Past Medical History:   Diagnosis Date    Atrial fibrillation with slow ventricular response ([address]) 12/10/2016    Benign hypertensive heart disease without heart failure     Coronary atherosclerosis of native coronary artery     presented with NSTEMI, s/p CABG 2006    Mixed hyperlipidemia     S/P CABG x 3 2006    LIMA-LAD, VG-OM, VG-PDA    Type II or unspecified type diabetes mellitus without mention of complication, not stated as uncontrolled              CURRENT MEDICATIONS:    .  Current Outpatient Medications   Medication Sig Dispense Refill    amLODIPine (NORVASC) 5 MG tablet Take 1 tablet by mouth daily      apixaban (ELIQUIS) 5 MG TABS tablet Take 1 tablet by mouth 2 times daily      choline fenofibrate (TRILIPIX) 135 MG CPDR delayed release capsule Take by mouth daily      cloNIDine (CATAPRES) 0.2 MG tablet Take by mouth 2 times daily      ezetimibe (ZETIA) 10 MG tablet Take 1 tablet by mouth daily      magnesium oxide (MAG-OX) 400 MG tablet Take 1 tablet by mouth daily      metFORMIN (GLUCOPHAGE) 500 MG tablet Take by mouth 2 times daily (with meals)      rosuvastatin (CRESTOR) 20 MG tablet Take 1 tablet by mouth      sildenafil (VIAGRA) 100 MG tablet Take 1 tablet by mouth as needed      tamsulosin (FLOMAX) 0.4 MG capsule Take 1 capsule by mouth 2 times daily      timolol (TIMOPTIC) 0.5 % ophthalmic solution INSTILL 1 DROP INTO BOTH EYES IN THE MORNING       No current facility-administered medications for this visit.        Allergies   Allergen Reactions    Cholecalciferol Hives     Large doses         SOCIAL HISTORY:     Social History     Tobacco Use    Smoking status: Never    Smokeless tobacco:

## 2023-12-05 NOTE — PROGRESS NOTES
Chief Complaint   Patient presents with    Atrial Fibrillation    Hypertension    Follow-up     6 month     Vitals:    12/05/23 1258   Weight: 111.7 kg (246 lb 3.2 oz)   Height: 1.778 m (5' 10\")     Chest pain denied   SOB denied   Palpitations denied   Swelling in hands/feet denied   Dizziness denied   Recent hospital stays denied   Refills denied

## 2023-12-06 ENCOUNTER — TELEPHONE (OUTPATIENT)
Age: 68
End: 2023-12-06

## 2023-12-06 NOTE — TELEPHONE ENCOUNTER
Enrolled with [employer] - Ordered and being shipped to patient's home address on file.   ETA within 5-7 Business Days.      ----- Message from Peter Arcos MD sent at 12/5/2023  1:20 PM EST -----  Regarding: 3 day holter  Can you please sned him 3 day holter for bradycardia     Thanks

## 2024-01-23 RX ORDER — AMLODIPINE BESYLATE 5 MG/1
5 TABLET ORAL DAILY
Qty: 90 TABLET | Refills: 1 | Status: SHIPPED | OUTPATIENT
Start: 2024-01-23

## 2024-01-26 ENCOUNTER — TELEPHONE (OUTPATIENT)
Age: 69
End: 2024-01-26

## 2024-01-26 DIAGNOSIS — I11.9 HYPERTENSIVE HEART DISEASE WITHOUT HEART FAILURE: Primary | ICD-10-CM

## 2024-01-26 RX ORDER — LOSARTAN POTASSIUM 50 MG/1
50 TABLET ORAL DAILY
Qty: 30 TABLET | Refills: 5 | Status: SHIPPED | OUTPATIENT
Start: 2024-01-26

## 2024-01-26 NOTE — TELEPHONE ENCOUNTER
Called pt,  LVM  Per Dr. Codey Rodriguez: \"Hi - he sent recent BP readings - /90 or so     Can you please have him add losartan 50 mg daily.     I assume PCP is checking labs regularly --- if not have him let us know and we would then check CMP and lipids in 2-3 weeks.\"

## 2024-01-26 NOTE — TELEPHONE ENCOUNTER
R/t call to pt,  Pt is agreeable to starting Losartan.  He just saw PCP yesterday.  They recently had labs drawn including chol.  Let him know I would request labs from PCP but we will also need to have labs drawn again in 2-3 weeks.    Pt agreeable to plan.

## 2024-02-07 ENCOUNTER — TELEPHONE (OUTPATIENT)
Age: 69
End: 2024-02-07

## 2024-02-07 NOTE — TELEPHONE ENCOUNTER
Pt called w/questions about the lab request he got today in the mail. Please advise.    Pt # [phone]

## 2024-02-07 NOTE — TELEPHONE ENCOUNTER
Spoke to patient- Patient states that he just started on the Losartan 50 mg. Patient advised that we can push the labs back a few weeks. Patient voiced understanding.

## 2024-03-07 ENCOUNTER — OFFICE VISIT (OUTPATIENT)
Age: 69
End: 2024-03-07
Payer: MEDICARE

## 2024-03-07 VITALS
SYSTOLIC BLOOD PRESSURE: 132 MMHG | WEIGHT: 245 LBS | DIASTOLIC BLOOD PRESSURE: 80 MMHG | OXYGEN SATURATION: 95 % | HEIGHT: 70 IN | BODY MASS INDEX: 35.07 KG/M2 | HEART RATE: 74 BPM

## 2024-03-07 DIAGNOSIS — I11.9 HYPERTENSIVE HEART DISEASE WITHOUT HEART FAILURE: ICD-10-CM

## 2024-03-07 DIAGNOSIS — I10 PRIMARY HYPERTENSION: ICD-10-CM

## 2024-03-07 DIAGNOSIS — Z95.1 PRESENCE OF AORTOCORONARY BYPASS GRAFT: ICD-10-CM

## 2024-03-07 DIAGNOSIS — R06.09 DOE (DYSPNEA ON EXERTION): ICD-10-CM

## 2024-03-07 DIAGNOSIS — I25.118 CORONARY ARTERY DISEASE OF NATIVE ARTERY OF NATIVE HEART WITH STABLE ANGINA PECTORIS (HCC): Primary | ICD-10-CM

## 2024-03-07 DIAGNOSIS — E78.2 MIXED HYPERLIPIDEMIA: ICD-10-CM

## 2024-03-07 PROCEDURE — G8484 FLU IMMUNIZE NO ADMIN: HCPCS

## 2024-03-07 PROCEDURE — 3075F SYST BP GE 130 - 139MM HG: CPT

## 2024-03-07 PROCEDURE — G8427 DOCREV CUR MEDS BY ELIG CLIN: HCPCS

## 2024-03-07 PROCEDURE — 3017F COLORECTAL CA SCREEN DOC REV: CPT

## 2024-03-07 PROCEDURE — 99214 OFFICE O/P EST MOD 30 MIN: CPT

## 2024-03-07 PROCEDURE — 1036F TOBACCO NON-USER: CPT

## 2024-03-07 PROCEDURE — 3079F DIAST BP 80-89 MM HG: CPT

## 2024-03-07 PROCEDURE — G8417 CALC BMI ABV UP PARAM F/U: HCPCS

## 2024-03-07 PROCEDURE — 1123F ACP DISCUSS/DSCN MKR DOCD: CPT

## 2024-03-07 RX ORDER — DORZOLAMIDE HYDROCHLORIDE AND TIMOLOL MALEATE 20; 5 MG/ML; MG/ML
1 SOLUTION/ DROPS OPHTHALMIC 2 TIMES DAILY
COMMUNITY
Start: 2024-01-21

## 2024-03-07 RX ORDER — LATANOPROST 50 UG/ML
1 SOLUTION/ DROPS OPHTHALMIC NIGHTLY
COMMUNITY
Start: 2024-01-09

## 2024-03-07 NOTE — PROGRESS NOTES
Chief Complaint   Patient presents with    Follow-up     3 months    Atrial Fibrillation    Coronary Artery Disease    Shortness of Breath     Vitals:    03/07/24 1129   BP: 132/80   Site: Left Upper Arm   Position: Sitting   Cuff Size: Large Adult   Pulse: 74   SpO2: 95%   Weight: 111.1 kg (245 lb)   Height: 1.778 m (5' 10\")       Chest pressure left sided; wakes up at night and feels it.    ER, urgent care, or hospitalized outside of [facility] since your last visit?  NO     Refills NO     Jaw Right side; CPAP strap may be irritating something underneath.    Older sister had 3 cancers (lung, uterine, breast).    Hasn't taken meds yet this morning.  
0.4 MG capsule Take 1 capsule by mouth 2 times daily      timolol (TIMOPTIC) 0.5 % ophthalmic solution INSTILL 1 DROP INTO BOTH EYES IN THE MORNING (Patient not taking: Reported on 3/7/2024)       No current facility-administered medications for this visit.       Allergies   Allergen Reactions    Cholecalciferol Hives     Large doses         SOCIAL HISTORY:     Social History     Tobacco Use    Smoking status: Never     Passive exposure: Past    Smokeless tobacco: Never   Substance Use Topics    Alcohol use: Yes    Drug use: Not Currently         FAMILY HISTORY:     Family History   Problem Relation Age of Onset    Heart Attack Father          REVIEW OF SYMPTOMS:     Review of Symptoms:  Constitutional: Negative for fever, chills  HEENT: Negative for nosebleeds, tinnitus, and vision changes.   Respiratory: Negative for cough, wheezing  Cardiovascular: Negative for orthopnea, claudication, syncope  Gastrointestinal: Negative for abdominal pain, diarrhea, melena.   Genitourinary: Negative for dysuria  Musculoskeletal: Negative for myalgias.   Skin: Negative for rash  Heme: No problems bleeding.  Neurological: Negative for speech change and focal weakness.       PHYSICAL EXAM:     Physical Exam:  /80 (Site: Left Upper Arm, Position: Sitting, Cuff Size: Large Adult)   Pulse 74   Ht 1.778 m (5' 10\")   Wt 111.1 kg (245 lb)   SpO2 95%   BMI 35.15 kg/m²     Patient appears generally well, mood and affect are appropriate and pleasant.  HEENT:  Hearing intact, non-icteric, normocephalic, atraumatic.   Neck Exam: Supple, No JVD   Lung Exam: Clear to auscultation, even breath sounds.   Cardiac Exam: Irreg Irreg with no murmur or rub  Abdomen: Soft, non-tender, obese   Extremities: Moves all ext well. No lower extremity edema.  MSKTL: Overall good ROM ext  Skin: No significant rashes  Psych: Appropriate affect  Neuro - Grossly intact        LABS / OTHER STUDIES:       Labs 2/21 - Apob 118, proBNP 287, CMP OK,   Labs

## 2024-03-21 ENCOUNTER — TELEPHONE (OUTPATIENT)
Age: 69
End: 2024-03-21

## 2024-03-21 DIAGNOSIS — I25.118 CORONARY ARTERY DISEASE OF NATIVE ARTERY OF NATIVE HEART WITH STABLE ANGINA PECTORIS (HCC): Primary | ICD-10-CM

## 2024-03-21 DIAGNOSIS — I10 PRIMARY HYPERTENSION: ICD-10-CM

## 2024-03-21 DIAGNOSIS — E78.2 MIXED HYPERLIPIDEMIA: ICD-10-CM

## 2024-03-21 DIAGNOSIS — R06.09 DOE (DYSPNEA ON EXERTION): ICD-10-CM

## 2024-03-21 LAB
BUN SERPL-MCNC: 13 MG/DL (ref 8–27)
BUN/CREAT SERPL: 16 (ref 10–24)
CALCIUM SERPL-MCNC: 10.8 MG/DL (ref 8.6–10.2)
CHLORIDE SERPL-SCNC: 99 MMOL/L (ref 96–106)
CHOLEST SERPL-MCNC: 227 MG/DL (ref 100–199)
CO2 SERPL-SCNC: 30 MMOL/L (ref 20–29)
CREAT SERPL-MCNC: 0.82 MG/DL (ref 0.76–1.27)
EGFRCR SERPLBLD CKD-EPI 2021: 94 ML/MIN/1.73
GLUCOSE SERPL-MCNC: 130 MG/DL (ref 70–99)
HDLC SERPL-MCNC: 43 MG/DL
LDLC SERPL CALC-MCNC: 170 MG/DL (ref 0–99)
POTASSIUM SERPL-SCNC: 4 MMOL/L (ref 3.5–5.2)
SODIUM SERPL-SCNC: 141 MMOL/L (ref 134–144)
TRIGL SERPL-MCNC: 79 MG/DL (ref 0–149)
VLDLC SERPL CALC-MCNC: 14 MG/DL (ref 5–40)

## 2024-03-21 RX ORDER — ROSUVASTATIN CALCIUM 20 MG/1
20 TABLET, COATED ORAL DAILY
Qty: 90 TABLET | Refills: 3 | Status: SHIPPED | OUTPATIENT
Start: 2024-03-21

## 2024-03-21 NOTE — TELEPHONE ENCOUNTER
Called pt,  Per Rupinder HOLBROOK NP: \"Should be on statin, fibrate and  Zetia. Can we check as his LDL is higher.\"    Doesn't have Rosuvastatin-- will call in rx.    Confirmed he does have Fenofibrate 135, Zetia 10.

## 2024-04-10 ENCOUNTER — ANCILLARY PROCEDURE (OUTPATIENT)
Age: 69
End: 2024-04-10
Payer: MEDICARE

## 2024-04-10 VITALS
WEIGHT: 245 LBS | BODY MASS INDEX: 35.07 KG/M2 | SYSTOLIC BLOOD PRESSURE: 128 MMHG | DIASTOLIC BLOOD PRESSURE: 84 MMHG | HEART RATE: 62 BPM | HEIGHT: 70 IN

## 2024-04-10 DIAGNOSIS — R06.09 DOE (DYSPNEA ON EXERTION): ICD-10-CM

## 2024-04-10 PROCEDURE — C8929 TTE W OR WO FOL WCON,DOPPLER: HCPCS | Performed by: SPECIALIST

## 2024-04-10 RX ADMIN — PERFLUTREN 1.3 ML: 6.52 INJECTION, SUSPENSION INTRAVENOUS at 15:00

## 2024-04-11 LAB
ECHO AO ASC DIAM: 4.4 CM
ECHO AO ASCENDING AORTA INDEX: 1.93 CM/M2
ECHO AO ROOT DIAM: 4 CM
ECHO AO ROOT INDEX: 1.75 CM/M2
ECHO AV AREA PEAK VELOCITY: 2.7 CM2
ECHO AV AREA VTI: 2.8 CM2
ECHO AV AREA/BSA PEAK VELOCITY: 1.2 CM2/M2
ECHO AV AREA/BSA VTI: 1.2 CM2/M2
ECHO AV MEAN GRADIENT: 3 MMHG
ECHO AV MEAN VELOCITY: 0.8 M/S
ECHO AV PEAK GRADIENT: 5 MMHG
ECHO AV PEAK VELOCITY: 1.1 M/S
ECHO AV VELOCITY RATIO: 0.64
ECHO AV VTI: 20.7 CM
ECHO BSA: 2.34 M2
ECHO LA DIAMETER INDEX: 2.15 CM/M2
ECHO LA DIAMETER: 4.9 CM
ECHO LA TO AORTIC ROOT RATIO: 1.23
ECHO LV FRACTIONAL SHORTENING: 28 % (ref 28–44)
ECHO LV INTERNAL DIMENSION DIASTOLE INDEX: 2.06 CM/M2
ECHO LV INTERNAL DIMENSION DIASTOLIC: 4.7 CM (ref 4.2–5.9)
ECHO LV INTERNAL DIMENSION SYSTOLIC INDEX: 1.49 CM/M2
ECHO LV INTERNAL DIMENSION SYSTOLIC: 3.4 CM
ECHO LV IVSD: 1.2 CM (ref 0.6–1)
ECHO LV MASS 2D: 212 G (ref 88–224)
ECHO LV MASS INDEX 2D: 93 G/M2 (ref 49–115)
ECHO LV POSTERIOR WALL DIASTOLIC: 1.2 CM (ref 0.6–1)
ECHO LV RELATIVE WALL THICKNESS RATIO: 0.51
ECHO LVOT AREA: 4.5 CM2
ECHO LVOT AV VTI INDEX: 0.63
ECHO LVOT DIAM: 2.4 CM
ECHO LVOT MEAN GRADIENT: 1 MMHG
ECHO LVOT PEAK GRADIENT: 2 MMHG
ECHO LVOT PEAK VELOCITY: 0.7 M/S
ECHO LVOT STROKE VOLUME INDEX: 25.8 ML/M2
ECHO LVOT SV: 58.8 ML
ECHO LVOT VTI: 13 CM
ECHO RV TAPSE: 1.6 CM (ref 1.7–?)

## 2024-04-12 ENCOUNTER — TELEPHONE (OUTPATIENT)
Age: 69
End: 2024-04-12

## 2024-04-12 NOTE — TELEPHONE ENCOUNTER
Per Dr. Codey Rodriguez: \"Please let him know echo overall stable.  Will recheck an echo in 1 year     Spoke to patient- Patient advised of recent results and voiced understanding.

## 2024-07-16 ENCOUNTER — OFFICE VISIT (OUTPATIENT)
Age: 69
End: 2024-07-16
Payer: MEDICARE

## 2024-07-16 VITALS
OXYGEN SATURATION: 94 % | HEART RATE: 79 BPM | HEIGHT: 70 IN | SYSTOLIC BLOOD PRESSURE: 132 MMHG | BODY MASS INDEX: 34.84 KG/M2 | DIASTOLIC BLOOD PRESSURE: 72 MMHG | WEIGHT: 243.4 LBS

## 2024-07-16 DIAGNOSIS — E11.9 TYPE 2 DIABETES MELLITUS WITHOUT COMPLICATION, WITHOUT LONG-TERM CURRENT USE OF INSULIN (HCC): ICD-10-CM

## 2024-07-16 DIAGNOSIS — Z95.1 S/P CABG X 3: ICD-10-CM

## 2024-07-16 DIAGNOSIS — E78.2 MIXED HYPERLIPIDEMIA: ICD-10-CM

## 2024-07-16 DIAGNOSIS — I48.11 LONGSTANDING PERSISTENT ATRIAL FIBRILLATION (HCC): ICD-10-CM

## 2024-07-16 DIAGNOSIS — I25.118 CORONARY ARTERY DISEASE OF NATIVE ARTERY OF NATIVE HEART WITH STABLE ANGINA PECTORIS (HCC): Primary | ICD-10-CM

## 2024-07-16 PROCEDURE — 99214 OFFICE O/P EST MOD 30 MIN: CPT | Performed by: SPECIALIST

## 2024-07-16 PROCEDURE — 1123F ACP DISCUSS/DSCN MKR DOCD: CPT | Performed by: SPECIALIST

## 2024-07-16 PROCEDURE — G8427 DOCREV CUR MEDS BY ELIG CLIN: HCPCS | Performed by: SPECIALIST

## 2024-07-16 PROCEDURE — 2022F DILAT RTA XM EVC RTNOPTHY: CPT | Performed by: SPECIALIST

## 2024-07-16 PROCEDURE — 3046F HEMOGLOBIN A1C LEVEL >9.0%: CPT | Performed by: SPECIALIST

## 2024-07-16 PROCEDURE — G8417 CALC BMI ABV UP PARAM F/U: HCPCS | Performed by: SPECIALIST

## 2024-07-16 PROCEDURE — 1036F TOBACCO NON-USER: CPT | Performed by: SPECIALIST

## 2024-07-16 PROCEDURE — 3017F COLORECTAL CA SCREEN DOC REV: CPT | Performed by: SPECIALIST

## 2024-07-16 RX ORDER — EZETIMIBE 10 MG/1
10 TABLET ORAL DAILY
Qty: 30 TABLET | Refills: 3 | Status: SHIPPED | OUTPATIENT
Start: 2024-07-16 | End: 2024-07-16 | Stop reason: SDUPTHER

## 2024-07-16 RX ORDER — EZETIMIBE 10 MG/1
10 TABLET ORAL DAILY
Qty: 90 TABLET | Refills: 3 | Status: SHIPPED | OUTPATIENT
Start: 2024-07-16

## 2024-07-16 NOTE — PROGRESS NOTES
Codey Rodriguez MD. [facility]          Patient: Chet Randall Jr.  : 1955      Today's Date: 2024        HISTORY OF PRESENT ILLNESS:     History of Present Illness:    Doing OK overall.   No new cardiac complaints.  Class 1-2 PLATA.   Gets SOB bending over to tie shoes.  No orthopnea.   No sig CP.      PAST MEDICAL HISTORY:     Past Medical History:   Diagnosis Date    Atrial fibrillation with slow ventricular response (HCC) 12/10/2016    Benign hypertensive heart disease without heart failure     Coronary atherosclerosis of native coronary artery     presented with NSTEMI, s/p CABG 2006    Mixed hyperlipidemia     S/P CABG x 3 2006    LIMA-LAD, VG-OM, VG-PDA    Type II or unspecified type diabetes mellitus without mention of complication, not stated as uncontrolled              CURRENT MEDICATIONS:    .  Current Outpatient Medications   Medication Sig Dispense Refill    rosuvastatin (CRESTOR) 20 MG tablet Take 1 tablet by mouth daily 90 tablet 3    latanoprost (XALATAN) 0.005 % ophthalmic solution Place 1 drop into both eyes at bedtime      dorzolamide-timolol (COSOPT) 2-0.5 % ophthalmic solution Place 1 drop into both eyes 2 times daily      losartan (COZAAR) 50 MG tablet Take 1 tablet by mouth daily 30 tablet 5    amLODIPine (NORVASC) 5 MG tablet TAKE 1 TABLET DAILY 90 tablet 1    apixaban (ELIQUIS) 5 MG TABS tablet Take 1 tablet by mouth 2 times daily      choline fenofibrate (TRILIPIX) 135 MG CPDR delayed release capsule Take by mouth daily      cloNIDine (CATAPRES) 0.2 MG tablet Take by mouth 2 times daily      ezetimibe (ZETIA) 10 MG tablet Take 1 tablet by mouth daily      magnesium oxide (MAG-OX) 400 MG tablet Take 1 tablet by mouth daily      metFORMIN (GLUCOPHAGE) 500 MG tablet Take by mouth 2 times daily (with meals)      sildenafil (VIAGRA) 100 MG tablet Take 1 tablet by mouth as needed      tamsulosin (FLOMAX) 0.4 MG capsule Take 1 capsule by mouth 2 times daily      timolol (TIMOPTIC) 0.5 %

## 2024-07-19 RX ORDER — AMLODIPINE BESYLATE 5 MG/1
5 TABLET ORAL DAILY
Qty: 90 TABLET | Refills: 3 | Status: SHIPPED | OUTPATIENT
Start: 2024-07-19

## 2024-08-07 ENCOUNTER — OFFICE VISIT (OUTPATIENT)
Age: 69
End: 2024-08-07
Payer: MEDICARE

## 2024-08-07 VITALS
BODY MASS INDEX: 34.84 KG/M2 | HEART RATE: 60 BPM | OXYGEN SATURATION: 96 % | WEIGHT: 243.4 LBS | DIASTOLIC BLOOD PRESSURE: 80 MMHG | HEIGHT: 70 IN | SYSTOLIC BLOOD PRESSURE: 118 MMHG

## 2024-08-07 DIAGNOSIS — I25.118 ATHEROSCLEROSIS OF NATIVE CORONARY ARTERY OF NATIVE HEART WITH STABLE ANGINA PECTORIS (HCC): ICD-10-CM

## 2024-08-07 DIAGNOSIS — E66.01 SEVERE OBESITY (HCC): ICD-10-CM

## 2024-08-07 DIAGNOSIS — Z95.1 S/P CABG X 3: ICD-10-CM

## 2024-08-07 DIAGNOSIS — E11.9 TYPE 2 DIABETES MELLITUS WITHOUT COMPLICATION, WITHOUT LONG-TERM CURRENT USE OF INSULIN (HCC): ICD-10-CM

## 2024-08-07 DIAGNOSIS — I11.9 BENIGN HYPERTENSIVE HEART DISEASE WITHOUT HEART FAILURE: ICD-10-CM

## 2024-08-07 DIAGNOSIS — I48.91 ATRIAL FIBRILLATION WITH SLOW VENTRICULAR RESPONSE (HCC): Primary | ICD-10-CM

## 2024-08-07 PROCEDURE — 93005 ELECTROCARDIOGRAM TRACING: CPT | Performed by: INTERNAL MEDICINE

## 2024-08-07 PROCEDURE — G8417 CALC BMI ABV UP PARAM F/U: HCPCS | Performed by: INTERNAL MEDICINE

## 2024-08-07 PROCEDURE — 1036F TOBACCO NON-USER: CPT | Performed by: INTERNAL MEDICINE

## 2024-08-07 PROCEDURE — G8427 DOCREV CUR MEDS BY ELIG CLIN: HCPCS | Performed by: INTERNAL MEDICINE

## 2024-08-07 PROCEDURE — 3017F COLORECTAL CA SCREEN DOC REV: CPT | Performed by: INTERNAL MEDICINE

## 2024-08-07 PROCEDURE — 93010 ELECTROCARDIOGRAM REPORT: CPT | Performed by: INTERNAL MEDICINE

## 2024-08-07 PROCEDURE — 99204 OFFICE O/P NEW MOD 45 MIN: CPT | Performed by: INTERNAL MEDICINE

## 2024-08-07 PROCEDURE — 1123F ACP DISCUSS/DSCN MKR DOCD: CPT | Performed by: INTERNAL MEDICINE

## 2024-08-07 PROCEDURE — 3046F HEMOGLOBIN A1C LEVEL >9.0%: CPT | Performed by: INTERNAL MEDICINE

## 2024-08-07 PROCEDURE — 2022F DILAT RTA XM EVC RTNOPTHY: CPT | Performed by: INTERNAL MEDICINE

## 2024-08-07 RX ORDER — CLONIDINE HYDROCHLORIDE 0.1 MG/1
0.1 TABLET ORAL 2 TIMES DAILY
Qty: 180 TABLET | Refills: 3 | Status: SHIPPED | OUTPATIENT
Start: 2024-08-07

## 2024-08-07 NOTE — PROGRESS NOTES
Chief Complaint   Patient presents with    AF    Bradycardia     Vitals:    08/07/24 1431   BP: 118/80   Site: Left Upper Arm   Position: Sitting   Pulse: 60   SpO2: 96%   Weight: 110.4 kg (243 lb 6.4 oz)   Height: 1.778 m (5' 10\")       Chest pain NO     ER, urgent care, or hospitalized outside of [facility] since your last visit?  NO     Refills NO

## 2024-08-07 NOTE — PATIENT INSTRUCTIONS
Decrease Clonidine to 0.1 mg twice a day  Please call us if you develop any worsening dizziness, lightheadedness, near-syncope, or syncope, you might benefit from a pacemaker

## 2024-08-07 NOTE — PROGRESS NOTES
Cardiac Electrophysiology OFFICE Consultation Note       Assessment/Plan:   1. Atrial fibrillation with slow ventricular response (HCC)  -     EKG 12 Lead  2. Benign hypertensive heart disease without heart failure  3. Atherosclerosis of native coronary artery of native heart with stable angina pectoris ([facility])  4. Type 2 diabetes mellitus without complication, without long-term current use of insulin ([facility])  5. S/P CABG x 3  6. Severe obesity ([facility])       Primary Cardiologist: Jennifer Schneider atrial fibrillation:   rate controlled, does report shortness of breath and some fatigue. Holter monitor 12/2023 showed 100% AF with average HR of 64 bpm.   - EKG today atrial fibrillation 51 bpm  - Holter monitor 12/2023 showed AF burden of 100%, multiple pauses up to 4.3 seconds. He denies symptoms of dizziness, lightheadedness, presyncope, and syncope.   - Echo 4/2024 LVEF 55-60%, LA severely dilated.  - Given LA dilation and longstanding persistent AF vs. Chronic AF, not a candidate for ablation, also not a good candidate for AAD therapy given bradycardia and pauses.   - he does have bradycardia and pause up to 4.3 seconds  - will decrease Clonidine 0.1 mg BID  - Discussed role of pacemaker, transvenous vs leadless. If he has any worsening dizziness, lightheadedness, pre-syncope, syncope, he can contact us if he wishes to proceed.     Anticoagulation:   On Eliquis 5 mg BID for embolic CVA prophylaxis. Denies of any bleeding issues. Know to contact clinic with any bleeding side effects (BRBPR, melena, hemoptysis, hematuria).    Hypertension:   BP is well controlled on the current regimen. No change in antihypertensive medications today. Recommended routine exercise and low sodium diet.  - Decrease clonidine to 0.1 mg daily  - Continue Losartan 50 mg daily and amlodipine 5 mg daily    CAD s/p CABG x 3 2006:   continue OAC and statin    Subjective:       Chet DIAMOND Randall Jr. is a 68 y.o. patient who is seen for evaluation

## 2024-08-20 ENCOUNTER — HOSPITAL ENCOUNTER (OUTPATIENT)
Facility: HOSPITAL | Age: 69
Setting detail: SPECIMEN
Discharge: HOME | End: 2024-08-23